# Patient Record
Sex: MALE | Race: WHITE | NOT HISPANIC OR LATINO | Employment: FULL TIME | URBAN - METROPOLITAN AREA
[De-identification: names, ages, dates, MRNs, and addresses within clinical notes are randomized per-mention and may not be internally consistent; named-entity substitution may affect disease eponyms.]

---

## 2021-03-20 ENCOUNTER — APPOINTMENT (OUTPATIENT)
Dept: RADIOLOGY | Facility: MEDICAL CENTER | Age: 31
DRG: 439 | End: 2021-03-20
Attending: EMERGENCY MEDICINE
Payer: COMMERCIAL

## 2021-03-20 ENCOUNTER — HOSPITAL ENCOUNTER (INPATIENT)
Facility: MEDICAL CENTER | Age: 31
LOS: 2 days | DRG: 439 | End: 2021-03-23
Attending: EMERGENCY MEDICINE | Admitting: STUDENT IN AN ORGANIZED HEALTH CARE EDUCATION/TRAINING PROGRAM
Payer: COMMERCIAL

## 2021-03-20 DIAGNOSIS — F10.10 ALCOHOL ABUSE: ICD-10-CM

## 2021-03-20 DIAGNOSIS — K85.20 ALCOHOL-INDUCED ACUTE PANCREATITIS, UNSPECIFIED COMPLICATION STATUS: ICD-10-CM

## 2021-03-20 DIAGNOSIS — R10.31 RIGHT LOWER QUADRANT ABDOMINAL PAIN: ICD-10-CM

## 2021-03-20 DIAGNOSIS — R11.2 NON-INTRACTABLE VOMITING WITH NAUSEA, UNSPECIFIED VOMITING TYPE: ICD-10-CM

## 2021-03-20 DIAGNOSIS — K52.9 COLITIS: ICD-10-CM

## 2021-03-20 DIAGNOSIS — A41.9 SEPSIS, DUE TO UNSPECIFIED ORGANISM, UNSPECIFIED WHETHER ACUTE ORGAN DYSFUNCTION PRESENT (HCC): Primary | ICD-10-CM

## 2021-03-20 LAB
ALBUMIN SERPL BCP-MCNC: 4.7 G/DL (ref 3.2–4.9)
ALBUMIN/GLOB SERPL: 1.3 G/DL
ALP SERPL-CCNC: 157 U/L (ref 30–99)
ALT SERPL-CCNC: 192 U/L (ref 2–50)
ANION GAP SERPL CALC-SCNC: 26 MMOL/L (ref 7–16)
AST SERPL-CCNC: 433 U/L (ref 12–45)
BASOPHILS # BLD AUTO: 1.6 % (ref 0–1.8)
BASOPHILS # BLD: 0.07 K/UL (ref 0–0.12)
BILIRUB SERPL-MCNC: 2.7 MG/DL (ref 0.1–1.5)
BUN SERPL-MCNC: 10 MG/DL (ref 8–22)
CALCIUM SERPL-MCNC: 9.7 MG/DL (ref 8.5–10.5)
CHLORIDE SERPL-SCNC: 89 MMOL/L (ref 96–112)
CO2 SERPL-SCNC: 21 MMOL/L (ref 20–33)
CREAT SERPL-MCNC: 0.61 MG/DL (ref 0.5–1.4)
EOSINOPHIL # BLD AUTO: 0.01 K/UL (ref 0–0.51)
EOSINOPHIL NFR BLD: 0.2 % (ref 0–6.9)
ERYTHROCYTE [DISTWIDTH] IN BLOOD BY AUTOMATED COUNT: 45.9 FL (ref 35.9–50)
ETHANOL BLD-MCNC: 86.5 MG/DL (ref 0–10)
GLOBULIN SER CALC-MCNC: 3.7 G/DL (ref 1.9–3.5)
GLUCOSE SERPL-MCNC: 112 MG/DL (ref 65–99)
HCT VFR BLD AUTO: 47.1 % (ref 42–52)
HGB BLD-MCNC: 17.3 G/DL (ref 14–18)
IMM GRANULOCYTES # BLD AUTO: 0.01 K/UL (ref 0–0.11)
IMM GRANULOCYTES NFR BLD AUTO: 0.2 % (ref 0–0.9)
LACTATE BLD-SCNC: 6.9 MMOL/L (ref 0.5–2)
LIPASE SERPL-CCNC: 290 U/L (ref 11–82)
LYMPHOCYTES # BLD AUTO: 0.41 K/UL (ref 1–4.8)
LYMPHOCYTES NFR BLD: 9.4 % (ref 22–41)
MCH RBC QN AUTO: 34.7 PG (ref 27–33)
MCHC RBC AUTO-ENTMCNC: 36.7 G/DL (ref 33.7–35.3)
MCV RBC AUTO: 94.6 FL (ref 81.4–97.8)
MONOCYTES # BLD AUTO: 0.42 K/UL (ref 0–0.85)
MONOCYTES NFR BLD AUTO: 9.6 % (ref 0–13.4)
NEUTROPHILS # BLD AUTO: 3.45 K/UL (ref 1.82–7.42)
NEUTROPHILS NFR BLD: 79 % (ref 44–72)
NRBC # BLD AUTO: 0 K/UL
NRBC BLD-RTO: 0 /100 WBC
PLATELET # BLD AUTO: 51 K/UL (ref 164–446)
PMV BLD AUTO: 10.6 FL (ref 9–12.9)
POTASSIUM SERPL-SCNC: 4.5 MMOL/L (ref 3.6–5.5)
PROT SERPL-MCNC: 8.4 G/DL (ref 6–8.2)
RBC # BLD AUTO: 4.98 M/UL (ref 4.7–6.1)
SODIUM SERPL-SCNC: 136 MMOL/L (ref 135–145)
WBC # BLD AUTO: 4.4 K/UL (ref 4.8–10.8)

## 2021-03-20 PROCEDURE — 83605 ASSAY OF LACTIC ACID: CPT

## 2021-03-20 PROCEDURE — 96375 TX/PRO/DX INJ NEW DRUG ADDON: CPT

## 2021-03-20 PROCEDURE — 80053 COMPREHEN METABOLIC PANEL: CPT

## 2021-03-20 PROCEDURE — 94760 N-INVAS EAR/PLS OXIMETRY 1: CPT

## 2021-03-20 PROCEDURE — 81001 URINALYSIS AUTO W/SCOPE: CPT

## 2021-03-20 PROCEDURE — 99285 EMERGENCY DEPT VISIT HI MDM: CPT

## 2021-03-20 PROCEDURE — 700105 HCHG RX REV CODE 258: Performed by: EMERGENCY MEDICINE

## 2021-03-20 PROCEDURE — 82077 ASSAY SPEC XCP UR&BREATH IA: CPT

## 2021-03-20 PROCEDURE — 700111 HCHG RX REV CODE 636 W/ 250 OVERRIDE (IP): Performed by: EMERGENCY MEDICINE

## 2021-03-20 PROCEDURE — 83690 ASSAY OF LIPASE: CPT

## 2021-03-20 PROCEDURE — 85610 PROTHROMBIN TIME: CPT

## 2021-03-20 PROCEDURE — 85025 COMPLETE CBC W/AUTO DIFF WBC: CPT

## 2021-03-20 RX ORDER — SODIUM CHLORIDE 9 MG/ML
1000 INJECTION, SOLUTION INTRAVENOUS ONCE
Status: COMPLETED | OUTPATIENT
Start: 2021-03-20 | End: 2021-03-21

## 2021-03-20 RX ORDER — ONDANSETRON 2 MG/ML
4 INJECTION INTRAMUSCULAR; INTRAVENOUS ONCE
Status: COMPLETED | OUTPATIENT
Start: 2021-03-20 | End: 2021-03-20

## 2021-03-20 RX ORDER — METOCLOPRAMIDE HYDROCHLORIDE 5 MG/ML
10 INJECTION INTRAMUSCULAR; INTRAVENOUS ONCE
Status: COMPLETED | OUTPATIENT
Start: 2021-03-20 | End: 2021-03-20

## 2021-03-20 RX ORDER — LORAZEPAM 2 MG/ML
1 INJECTION INTRAMUSCULAR ONCE
Status: COMPLETED | OUTPATIENT
Start: 2021-03-20 | End: 2021-03-20

## 2021-03-20 RX ADMIN — ONDANSETRON 4 MG: 2 INJECTION INTRAMUSCULAR; INTRAVENOUS at 23:17

## 2021-03-20 RX ADMIN — LORAZEPAM 1 MG: 2 INJECTION INTRAMUSCULAR; INTRAVENOUS at 23:18

## 2021-03-20 RX ADMIN — FENTANYL CITRATE 50 MCG: 50 INJECTION, SOLUTION INTRAMUSCULAR; INTRAVENOUS at 23:18

## 2021-03-20 RX ADMIN — SODIUM CHLORIDE 1000 ML: 9 INJECTION, SOLUTION INTRAVENOUS at 23:18

## 2021-03-20 RX ADMIN — METOCLOPRAMIDE 10 MG: 5 INJECTION, SOLUTION INTRAMUSCULAR; INTRAVENOUS at 23:17

## 2021-03-21 ENCOUNTER — APPOINTMENT (OUTPATIENT)
Dept: RADIOLOGY | Facility: MEDICAL CENTER | Age: 31
DRG: 439 | End: 2021-03-21
Attending: STUDENT IN AN ORGANIZED HEALTH CARE EDUCATION/TRAINING PROGRAM
Payer: COMMERCIAL

## 2021-03-21 ENCOUNTER — APPOINTMENT (OUTPATIENT)
Dept: RADIOLOGY | Facility: MEDICAL CENTER | Age: 31
DRG: 439 | End: 2021-03-21
Attending: EMERGENCY MEDICINE
Payer: COMMERCIAL

## 2021-03-21 PROBLEM — R94.31 QT PROLONGATION: Status: ACTIVE | Noted: 2021-03-21

## 2021-03-21 PROBLEM — K70.10 ALCOHOLIC HEPATITIS WITHOUT ASCITES: Status: ACTIVE | Noted: 2020-10-15

## 2021-03-21 PROBLEM — F10.939 ALCOHOL WITHDRAWAL (HCC): Status: ACTIVE | Noted: 2021-03-21

## 2021-03-21 PROBLEM — R10.13 ABDOMINAL PAIN, ACUTE, EPIGASTRIC: Status: ACTIVE | Noted: 2021-03-21

## 2021-03-21 PROBLEM — F10.10 ALCOHOL ABUSE: Status: ACTIVE | Noted: 2020-10-14

## 2021-03-21 PROBLEM — K29.20 ALCOHOLIC GASTRITIS: Status: ACTIVE | Noted: 2020-10-14

## 2021-03-21 PROBLEM — A41.9 SEPSIS (HCC): Status: ACTIVE | Noted: 2021-03-21

## 2021-03-21 PROBLEM — E87.20 LACTIC ACIDOSIS: Status: ACTIVE | Noted: 2020-10-14

## 2021-03-21 PROBLEM — K85.20 ALCOHOL-INDUCED ACUTE PANCREATITIS: Status: ACTIVE | Noted: 2020-10-15

## 2021-03-21 PROBLEM — D69.6 THROMBOCYTOPENIA (HCC): Status: ACTIVE | Noted: 2021-03-21

## 2021-03-21 LAB
ALBUMIN SERPL BCP-MCNC: 3.5 G/DL (ref 3.2–4.9)
ALBUMIN SERPL BCP-MCNC: 3.7 G/DL (ref 3.2–4.9)
ALBUMIN/GLOB SERPL: 1.4 G/DL
ALBUMIN/GLOB SERPL: 1.5 G/DL
ALP SERPL-CCNC: 112 U/L (ref 30–99)
ALP SERPL-CCNC: 114 U/L (ref 30–99)
ALT SERPL-CCNC: 118 U/L (ref 2–50)
ALT SERPL-CCNC: 138 U/L (ref 2–50)
AMMONIA PLAS-SCNC: 37 UMOL/L (ref 11–45)
AMYLASE SERPL-CCNC: 111 U/L (ref 20–103)
ANION GAP SERPL CALC-SCNC: 10 MMOL/L (ref 7–16)
ANION GAP SERPL CALC-SCNC: 14 MMOL/L (ref 7–16)
APPEARANCE UR: CLEAR
AST SERPL-CCNC: 248 U/L (ref 12–45)
AST SERPL-CCNC: 296 U/L (ref 12–45)
BACTERIA #/AREA URNS HPF: NEGATIVE /HPF
BASOPHILS # BLD AUTO: 0.8 % (ref 0–1.8)
BASOPHILS # BLD: 0.02 K/UL (ref 0–0.12)
BILIRUB SERPL-MCNC: 2.7 MG/DL (ref 0.1–1.5)
BILIRUB SERPL-MCNC: 3.6 MG/DL (ref 0.1–1.5)
BILIRUB UR QL STRIP.AUTO: ABNORMAL
BUN SERPL-MCNC: 5 MG/DL (ref 8–22)
BUN SERPL-MCNC: 9 MG/DL (ref 8–22)
CALCIUM SERPL-MCNC: 8.3 MG/DL (ref 8.5–10.5)
CALCIUM SERPL-MCNC: 8.5 MG/DL (ref 8.5–10.5)
CFT BLD TEG: 8.1 MIN (ref 5–10)
CHLORIDE SERPL-SCNC: 96 MMOL/L (ref 96–112)
CHLORIDE SERPL-SCNC: 97 MMOL/L (ref 96–112)
CLOT ANGLE BLD TEG: 42.3 DEGREES (ref 53–72)
CLOT LYSIS 30M P MA LENFR BLD TEG: 0 % (ref 0–8)
CO2 SERPL-SCNC: 26 MMOL/L (ref 20–33)
CO2 SERPL-SCNC: 28 MMOL/L (ref 20–33)
COLOR UR: ABNORMAL
CREAT SERPL-MCNC: 0.48 MG/DL (ref 0.5–1.4)
CREAT SERPL-MCNC: 0.53 MG/DL (ref 0.5–1.4)
CT.EXTRINSIC BLD ROTEM: 5.1 MIN (ref 1–3)
D DIMER PPP IA.FEU-MCNC: 1.05 UG/ML (FEU) (ref 0–0.5)
EKG IMPRESSION: NORMAL
EOSINOPHIL # BLD AUTO: 0.1 K/UL (ref 0–0.51)
EOSINOPHIL NFR BLD: 4.1 % (ref 0–6.9)
EPI CELLS #/AREA URNS HPF: NEGATIVE /HPF
ERYTHROCYTE [DISTWIDTH] IN BLOOD BY AUTOMATED COUNT: 46.6 FL (ref 35.9–50)
ERYTHROCYTE [DISTWIDTH] IN BLOOD BY AUTOMATED COUNT: 48.2 FL (ref 35.9–50)
GLOBULIN SER CALC-MCNC: 2.4 G/DL (ref 1.9–3.5)
GLOBULIN SER CALC-MCNC: 2.6 G/DL (ref 1.9–3.5)
GLUCOSE SERPL-MCNC: 100 MG/DL (ref 65–99)
GLUCOSE SERPL-MCNC: 116 MG/DL (ref 65–99)
GLUCOSE UR STRIP.AUTO-MCNC: ABNORMAL MG/DL
HCT VFR BLD AUTO: 36.9 % (ref 42–52)
HCT VFR BLD AUTO: 36.9 % (ref 42–52)
HGB BLD-MCNC: 13.3 G/DL (ref 14–18)
HGB BLD-MCNC: 13.4 G/DL (ref 14–18)
HYALINE CASTS #/AREA URNS LPF: ABNORMAL /LPF
IMM GRANULOCYTES # BLD AUTO: 0 K/UL (ref 0–0.11)
IMM GRANULOCYTES NFR BLD AUTO: 0 % (ref 0–0.9)
INR PPP: 1.13 (ref 0.87–1.13)
KETONES UR STRIP.AUTO-MCNC: ABNORMAL MG/DL
LACTATE BLD-SCNC: 2.7 MMOL/L (ref 0.5–2)
LACTATE BLD-SCNC: 5 MMOL/L (ref 0.5–2)
LACTATE BLD-SCNC: 5.7 MMOL/L (ref 0.5–2)
LEUKOCYTE ESTERASE UR QL STRIP.AUTO: ABNORMAL
LYMPHOCYTES # BLD AUTO: 0.9 K/UL (ref 1–4.8)
LYMPHOCYTES NFR BLD: 36.7 % (ref 22–41)
MAGNESIUM SERPL-MCNC: 1.1 MG/DL (ref 1.5–2.5)
MCF BLD TEG: 38.7 MM (ref 50–70)
MCH RBC QN AUTO: 34.5 PG (ref 27–33)
MCH RBC QN AUTO: 34.9 PG (ref 27–33)
MCHC RBC AUTO-ENTMCNC: 36 G/DL (ref 33.7–35.3)
MCHC RBC AUTO-ENTMCNC: 36.3 G/DL (ref 33.7–35.3)
MCV RBC AUTO: 95.1 FL (ref 81.4–97.8)
MCV RBC AUTO: 96.9 FL (ref 81.4–97.8)
MICRO URNS: ABNORMAL
MONOCYTES # BLD AUTO: 0.23 K/UL (ref 0–0.85)
MONOCYTES NFR BLD AUTO: 9.4 % (ref 0–13.4)
MUCOUS THREADS #/AREA URNS HPF: ABNORMAL /HPF
NEUTROPHILS # BLD AUTO: 1.2 K/UL (ref 1.82–7.42)
NEUTROPHILS NFR BLD: 49 % (ref 44–72)
NITRITE UR QL STRIP.AUTO: ABNORMAL
NRBC # BLD AUTO: 0 K/UL
NRBC BLD-RTO: 0 /100 WBC
NT-PROBNP SERPL IA-MCNC: 17 PG/ML (ref 0–125)
PH UR STRIP.AUTO: ABNORMAL [PH] (ref 5–8)
PLATELET # BLD AUTO: 25 K/UL (ref 164–446)
PLATELET # BLD AUTO: 31 K/UL (ref 164–446)
PMV BLD AUTO: 9.1 FL (ref 9–12.9)
PMV BLD AUTO: 9.9 FL (ref 9–12.9)
POTASSIUM SERPL-SCNC: 3.5 MMOL/L (ref 3.6–5.5)
POTASSIUM SERPL-SCNC: 4.4 MMOL/L (ref 3.6–5.5)
PROCALCITONIN SERPL-MCNC: 0.08 NG/ML
PROT SERPL-MCNC: 5.9 G/DL (ref 6–8.2)
PROT SERPL-MCNC: 6.3 G/DL (ref 6–8.2)
PROT UR QL STRIP: ABNORMAL MG/DL
PROTHROMBIN TIME: 14.8 SEC (ref 12–14.6)
RBC # BLD AUTO: 3.81 M/UL (ref 4.7–6.1)
RBC # BLD AUTO: 3.88 M/UL (ref 4.7–6.1)
RBC # URNS HPF: ABNORMAL /HPF
RBC UR QL AUTO: ABNORMAL
SARS-COV-2 RNA RESP QL NAA+PROBE: NOTDETECTED
SODIUM SERPL-SCNC: 134 MMOL/L (ref 135–145)
SODIUM SERPL-SCNC: 137 MMOL/L (ref 135–145)
SP GR UR STRIP.AUTO: ABNORMAL
SPECIMEN SOURCE: NORMAL
TEG ALGORITHM TGALG: ABNORMAL
UROBILINOGEN UR STRIP.AUTO-MCNC: ABNORMAL MG/DL
WBC # BLD AUTO: 2.5 K/UL (ref 4.8–10.8)
WBC # BLD AUTO: 3.6 K/UL (ref 4.8–10.8)
WBC #/AREA URNS HPF: ABNORMAL /HPF

## 2021-03-21 PROCEDURE — 93005 ELECTROCARDIOGRAM TRACING: CPT | Performed by: STUDENT IN AN ORGANIZED HEALTH CARE EDUCATION/TRAINING PROGRAM

## 2021-03-21 PROCEDURE — U0003 INFECTIOUS AGENT DETECTION BY NUCLEIC ACID (DNA OR RNA); SEVERE ACUTE RESPIRATORY SYNDROME CORONAVIRUS 2 (SARS-COV-2) (CORONAVIRUS DISEASE [COVID-19]), AMPLIFIED PROBE TECHNIQUE, MAKING USE OF HIGH THROUGHPUT TECHNOLOGIES AS DESCRIBED BY CMS-2020-01-R: HCPCS

## 2021-03-21 PROCEDURE — 700111 HCHG RX REV CODE 636 W/ 250 OVERRIDE (IP)

## 2021-03-21 PROCEDURE — 82150 ASSAY OF AMYLASE: CPT

## 2021-03-21 PROCEDURE — 96365 THER/PROPH/DIAG IV INF INIT: CPT

## 2021-03-21 PROCEDURE — 85379 FIBRIN DEGRADATION QUANT: CPT

## 2021-03-21 PROCEDURE — 700101 HCHG RX REV CODE 250: Performed by: STUDENT IN AN ORGANIZED HEALTH CARE EDUCATION/TRAINING PROGRAM

## 2021-03-21 PROCEDURE — 85576 BLOOD PLATELET AGGREGATION: CPT

## 2021-03-21 PROCEDURE — 700111 HCHG RX REV CODE 636 W/ 250 OVERRIDE (IP): Performed by: EMERGENCY MEDICINE

## 2021-03-21 PROCEDURE — A9270 NON-COVERED ITEM OR SERVICE: HCPCS | Performed by: STUDENT IN AN ORGANIZED HEALTH CARE EDUCATION/TRAINING PROGRAM

## 2021-03-21 PROCEDURE — 96368 THER/DIAG CONCURRENT INF: CPT

## 2021-03-21 PROCEDURE — 84145 PROCALCITONIN (PCT): CPT

## 2021-03-21 PROCEDURE — 80053 COMPREHEN METABOLIC PANEL: CPT

## 2021-03-21 PROCEDURE — 36415 COLL VENOUS BLD VENIPUNCTURE: CPT

## 2021-03-21 PROCEDURE — 74177 CT ABD & PELVIS W/CONTRAST: CPT

## 2021-03-21 PROCEDURE — 83735 ASSAY OF MAGNESIUM: CPT

## 2021-03-21 PROCEDURE — 82140 ASSAY OF AMMONIA: CPT

## 2021-03-21 PROCEDURE — 99233 SBSQ HOSP IP/OBS HIGH 50: CPT | Performed by: HOSPITALIST

## 2021-03-21 PROCEDURE — 700117 HCHG RX CONTRAST REV CODE 255: Performed by: EMERGENCY MEDICINE

## 2021-03-21 PROCEDURE — 87040 BLOOD CULTURE FOR BACTERIA: CPT

## 2021-03-21 PROCEDURE — U0005 INFEC AGEN DETEC AMPLI PROBE: HCPCS

## 2021-03-21 PROCEDURE — 700105 HCHG RX REV CODE 258: Performed by: STUDENT IN AN ORGANIZED HEALTH CARE EDUCATION/TRAINING PROGRAM

## 2021-03-21 PROCEDURE — 700111 HCHG RX REV CODE 636 W/ 250 OVERRIDE (IP): Performed by: STUDENT IN AN ORGANIZED HEALTH CARE EDUCATION/TRAINING PROGRAM

## 2021-03-21 PROCEDURE — 700102 HCHG RX REV CODE 250 W/ 637 OVERRIDE(OP): Performed by: STUDENT IN AN ORGANIZED HEALTH CARE EDUCATION/TRAINING PROGRAM

## 2021-03-21 PROCEDURE — 71045 X-RAY EXAM CHEST 1 VIEW: CPT

## 2021-03-21 PROCEDURE — 96367 TX/PROPH/DG ADDL SEQ IV INF: CPT

## 2021-03-21 PROCEDURE — 99223 1ST HOSP IP/OBS HIGH 75: CPT | Performed by: STUDENT IN AN ORGANIZED HEALTH CARE EDUCATION/TRAINING PROGRAM

## 2021-03-21 PROCEDURE — 85347 COAGULATION TIME ACTIVATED: CPT

## 2021-03-21 PROCEDURE — 700105 HCHG RX REV CODE 258: Performed by: EMERGENCY MEDICINE

## 2021-03-21 PROCEDURE — 85025 COMPLETE CBC W/AUTO DIFF WBC: CPT

## 2021-03-21 PROCEDURE — 85384 FIBRINOGEN ACTIVITY: CPT

## 2021-03-21 PROCEDURE — 96376 TX/PRO/DX INJ SAME DRUG ADON: CPT

## 2021-03-21 PROCEDURE — 770020 HCHG ROOM/CARE - TELE (206)

## 2021-03-21 PROCEDURE — 700101 HCHG RX REV CODE 250: Performed by: EMERGENCY MEDICINE

## 2021-03-21 PROCEDURE — 83880 ASSAY OF NATRIURETIC PEPTIDE: CPT

## 2021-03-21 PROCEDURE — 85027 COMPLETE CBC AUTOMATED: CPT

## 2021-03-21 PROCEDURE — 83605 ASSAY OF LACTIC ACID: CPT | Mod: 91

## 2021-03-21 RX ORDER — LORAZEPAM 2 MG/ML
1.5 INJECTION INTRAMUSCULAR
Status: DISCONTINUED | OUTPATIENT
Start: 2021-03-21 | End: 2021-03-23 | Stop reason: HOSPADM

## 2021-03-21 RX ORDER — KETOROLAC TROMETHAMINE 30 MG/ML
30 INJECTION, SOLUTION INTRAMUSCULAR; INTRAVENOUS EVERY 6 HOURS PRN
Status: DISCONTINUED | OUTPATIENT
Start: 2021-03-21 | End: 2021-03-22

## 2021-03-21 RX ORDER — LORAZEPAM 2 MG/ML
0.5 INJECTION INTRAMUSCULAR EVERY 4 HOURS PRN
Status: DISCONTINUED | OUTPATIENT
Start: 2021-03-21 | End: 2021-03-23 | Stop reason: HOSPADM

## 2021-03-21 RX ORDER — SODIUM CHLORIDE, SODIUM LACTATE, POTASSIUM CHLORIDE, CALCIUM CHLORIDE 600; 310; 30; 20 MG/100ML; MG/100ML; MG/100ML; MG/100ML
1000 INJECTION, SOLUTION INTRAVENOUS ONCE
Status: DISCONTINUED | OUTPATIENT
Start: 2021-03-21 | End: 2021-03-21

## 2021-03-21 RX ORDER — LORAZEPAM 2 MG/ML
2 INJECTION INTRAMUSCULAR ONCE
Status: COMPLETED | OUTPATIENT
Start: 2021-03-21 | End: 2021-03-21

## 2021-03-21 RX ORDER — PROCHLORPERAZINE EDISYLATE 5 MG/ML
5-10 INJECTION INTRAMUSCULAR; INTRAVENOUS EVERY 4 HOURS PRN
Status: DISCONTINUED | OUTPATIENT
Start: 2021-03-21 | End: 2021-03-21

## 2021-03-21 RX ORDER — ONDANSETRON 4 MG/1
4 TABLET, ORALLY DISINTEGRATING ORAL EVERY 4 HOURS PRN
Status: DISCONTINUED | OUTPATIENT
Start: 2021-03-21 | End: 2021-03-21

## 2021-03-21 RX ORDER — LORAZEPAM 1 MG/1
1 TABLET ORAL EVERY 4 HOURS PRN
Status: DISCONTINUED | OUTPATIENT
Start: 2021-03-21 | End: 2021-03-23 | Stop reason: HOSPADM

## 2021-03-21 RX ORDER — LORAZEPAM 0.5 MG/1
0.5 TABLET ORAL EVERY 4 HOURS PRN
Status: DISCONTINUED | OUTPATIENT
Start: 2021-03-21 | End: 2021-03-23 | Stop reason: HOSPADM

## 2021-03-21 RX ORDER — FENTANYL 25 UG/1
1 PATCH TRANSDERMAL
Status: DISCONTINUED | OUTPATIENT
Start: 2021-03-21 | End: 2021-03-21

## 2021-03-21 RX ORDER — LEVOFLOXACIN 5 MG/ML
750 INJECTION, SOLUTION INTRAVENOUS EVERY 24 HOURS
Status: DISCONTINUED | OUTPATIENT
Start: 2021-03-21 | End: 2021-03-21

## 2021-03-21 RX ORDER — ONDANSETRON 2 MG/ML
4 INJECTION INTRAMUSCULAR; INTRAVENOUS EVERY 4 HOURS PRN
Status: DISCONTINUED | OUTPATIENT
Start: 2021-03-21 | End: 2021-03-21

## 2021-03-21 RX ORDER — SODIUM CHLORIDE 9 MG/ML
INJECTION, SOLUTION INTRAVENOUS CONTINUOUS
Status: DISCONTINUED | OUTPATIENT
Start: 2021-03-21 | End: 2021-03-21

## 2021-03-21 RX ORDER — GAUZE BANDAGE 2" X 2"
100 BANDAGE TOPICAL DAILY
Status: DISCONTINUED | OUTPATIENT
Start: 2021-03-22 | End: 2021-03-23 | Stop reason: HOSPADM

## 2021-03-21 RX ORDER — SODIUM CHLORIDE, SODIUM LACTATE, POTASSIUM CHLORIDE, CALCIUM CHLORIDE 600; 310; 30; 20 MG/100ML; MG/100ML; MG/100ML; MG/100ML
INJECTION, SOLUTION INTRAVENOUS CONTINUOUS
Status: DISCONTINUED | OUTPATIENT
Start: 2021-03-21 | End: 2021-03-21

## 2021-03-21 RX ORDER — LORAZEPAM 2 MG/1
2 TABLET ORAL
Status: DISCONTINUED | OUTPATIENT
Start: 2021-03-21 | End: 2021-03-23 | Stop reason: HOSPADM

## 2021-03-21 RX ORDER — FOLIC ACID 1 MG/1
1 TABLET ORAL DAILY
Status: DISCONTINUED | OUTPATIENT
Start: 2021-03-22 | End: 2021-03-23 | Stop reason: HOSPADM

## 2021-03-21 RX ORDER — ACETAMINOPHEN 325 MG/1
650 TABLET ORAL EVERY 6 HOURS PRN
Status: DISCONTINUED | OUTPATIENT
Start: 2021-03-21 | End: 2021-03-21

## 2021-03-21 RX ORDER — LORAZEPAM 2 MG/1
4 TABLET ORAL
Status: DISCONTINUED | OUTPATIENT
Start: 2021-03-21 | End: 2021-03-23 | Stop reason: HOSPADM

## 2021-03-21 RX ORDER — PROMETHAZINE HYDROCHLORIDE 12.5 MG/1
12.5-25 SUPPOSITORY RECTAL EVERY 4 HOURS PRN
Status: DISCONTINUED | OUTPATIENT
Start: 2021-03-21 | End: 2021-03-21

## 2021-03-21 RX ORDER — SODIUM CHLORIDE, SODIUM LACTATE, POTASSIUM CHLORIDE, CALCIUM CHLORIDE 600; 310; 30; 20 MG/100ML; MG/100ML; MG/100ML; MG/100ML
1000 INJECTION, SOLUTION INTRAVENOUS ONCE
Status: COMPLETED | OUTPATIENT
Start: 2021-03-21 | End: 2021-03-21

## 2021-03-21 RX ORDER — LORAZEPAM 2 MG/ML
2 INJECTION INTRAMUSCULAR
Status: DISCONTINUED | OUTPATIENT
Start: 2021-03-21 | End: 2021-03-23 | Stop reason: HOSPADM

## 2021-03-21 RX ORDER — LEVOFLOXACIN 5 MG/ML
750 INJECTION, SOLUTION INTRAVENOUS ONCE
Status: COMPLETED | OUTPATIENT
Start: 2021-03-21 | End: 2021-03-21

## 2021-03-21 RX ORDER — PROMETHAZINE HYDROCHLORIDE 25 MG/1
12.5-25 TABLET ORAL EVERY 4 HOURS PRN
Status: DISCONTINUED | OUTPATIENT
Start: 2021-03-21 | End: 2021-03-21

## 2021-03-21 RX ORDER — MAGNESIUM SULFATE HEPTAHYDRATE 40 MG/ML
2 INJECTION, SOLUTION INTRAVENOUS ONCE
Status: COMPLETED | OUTPATIENT
Start: 2021-03-21 | End: 2021-03-21

## 2021-03-21 RX ORDER — POLYETHYLENE GLYCOL 3350 17 G/17G
1 POWDER, FOR SOLUTION ORAL
Status: DISCONTINUED | OUTPATIENT
Start: 2021-03-21 | End: 2021-03-23 | Stop reason: HOSPADM

## 2021-03-21 RX ORDER — LORAZEPAM 2 MG/ML
1 INJECTION INTRAMUSCULAR
Status: DISCONTINUED | OUTPATIENT
Start: 2021-03-21 | End: 2021-03-23 | Stop reason: HOSPADM

## 2021-03-21 RX ORDER — LABETALOL HYDROCHLORIDE 5 MG/ML
10 INJECTION, SOLUTION INTRAVENOUS EVERY 4 HOURS PRN
Status: DISCONTINUED | OUTPATIENT
Start: 2021-03-21 | End: 2021-03-23 | Stop reason: HOSPADM

## 2021-03-21 RX ORDER — SODIUM CHLORIDE, SODIUM LACTATE, POTASSIUM CHLORIDE, AND CALCIUM CHLORIDE .6; .31; .03; .02 G/100ML; G/100ML; G/100ML; G/100ML
30 INJECTION, SOLUTION INTRAVENOUS ONCE
Status: COMPLETED | OUTPATIENT
Start: 2021-03-21 | End: 2021-03-21

## 2021-03-21 RX ORDER — SODIUM CHLORIDE, SODIUM LACTATE, POTASSIUM CHLORIDE, AND CALCIUM CHLORIDE .6; .31; .03; .02 G/100ML; G/100ML; G/100ML; G/100ML
500 INJECTION, SOLUTION INTRAVENOUS
Status: DISCONTINUED | OUTPATIENT
Start: 2021-03-20 | End: 2021-03-21

## 2021-03-21 RX ORDER — BISACODYL 10 MG
10 SUPPOSITORY, RECTAL RECTAL
Status: DISCONTINUED | OUTPATIENT
Start: 2021-03-21 | End: 2021-03-23 | Stop reason: HOSPADM

## 2021-03-21 RX ORDER — AMOXICILLIN 250 MG
2 CAPSULE ORAL 2 TIMES DAILY
Status: DISCONTINUED | OUTPATIENT
Start: 2021-03-21 | End: 2021-03-23 | Stop reason: HOSPADM

## 2021-03-21 RX ADMIN — FENTANYL CITRATE 50 MCG: 50 INJECTION, SOLUTION INTRAMUSCULAR; INTRAVENOUS at 03:14

## 2021-03-21 RX ADMIN — LEVOFLOXACIN 750 MG: 750 INJECTION, SOLUTION INTRAVENOUS at 01:02

## 2021-03-21 RX ADMIN — SODIUM CHLORIDE, POTASSIUM CHLORIDE, SODIUM LACTATE AND CALCIUM CHLORIDE 1000 ML: 600; 310; 30; 20 INJECTION, SOLUTION INTRAVENOUS at 03:15

## 2021-03-21 RX ADMIN — LABETALOL HYDROCHLORIDE 10 MG: 5 INJECTION, SOLUTION INTRAVENOUS at 08:02

## 2021-03-21 RX ADMIN — KETOROLAC TROMETHAMINE 30 MG: 30 INJECTION, SOLUTION INTRAMUSCULAR; INTRAVENOUS at 23:36

## 2021-03-21 RX ADMIN — THIAMINE HYDROCHLORIDE: 100 INJECTION, SOLUTION INTRAMUSCULAR; INTRAVENOUS at 04:27

## 2021-03-21 RX ADMIN — KETOROLAC TROMETHAMINE 30 MG: 30 INJECTION, SOLUTION INTRAMUSCULAR; INTRAVENOUS at 18:05

## 2021-03-21 RX ADMIN — CHLORPROMAZINE HYDROCHLORIDE 25 MG: 25 INJECTION INTRAMUSCULAR at 11:55

## 2021-03-21 RX ADMIN — KETOROLAC TROMETHAMINE 30 MG: 30 INJECTION, SOLUTION INTRAMUSCULAR; INTRAVENOUS at 12:00

## 2021-03-21 RX ADMIN — SODIUM CHLORIDE, POTASSIUM CHLORIDE, SODIUM LACTATE AND CALCIUM CHLORIDE 1800 ML: 600; 310; 30; 20 INJECTION, SOLUTION INTRAVENOUS at 00:59

## 2021-03-21 RX ADMIN — CHLORPROMAZINE HYDROCHLORIDE 25 MG: 25 INJECTION INTRAMUSCULAR at 06:35

## 2021-03-21 RX ADMIN — METRONIDAZOLE 500 MG: 500 INJECTION, SOLUTION INTRAVENOUS at 02:26

## 2021-03-21 RX ADMIN — LORAZEPAM 0.5 MG: 0.5 TABLET ORAL at 05:56

## 2021-03-21 RX ADMIN — LORAZEPAM 2 MG: 2 INJECTION INTRAMUSCULAR; INTRAVENOUS at 03:14

## 2021-03-21 RX ADMIN — SODIUM CHLORIDE: 9 INJECTION, SOLUTION INTRAVENOUS at 05:55

## 2021-03-21 RX ADMIN — MAGNESIUM SULFATE 2 G: 2 INJECTION INTRAVENOUS at 11:55

## 2021-03-21 RX ADMIN — IOHEXOL 90 ML: 350 INJECTION, SOLUTION INTRAVENOUS at 02:30

## 2021-03-21 RX ADMIN — METRONIDAZOLE 500 MG: 500 INJECTION, SOLUTION INTRAVENOUS at 08:03

## 2021-03-21 RX ADMIN — FENTANYL CITRATE 100 MCG: 50 INJECTION, SOLUTION INTRAMUSCULAR; INTRAVENOUS at 01:12

## 2021-03-21 RX ADMIN — SODIUM CHLORIDE, POTASSIUM CHLORIDE, SODIUM LACTATE AND CALCIUM CHLORIDE: 600; 310; 30; 20 INJECTION, SOLUTION INTRAVENOUS at 09:30

## 2021-03-21 RX ADMIN — FENTANYL 1 PATCH: 25 PATCH, EXTENDED RELEASE TRANSDERMAL at 08:03

## 2021-03-21 ASSESSMENT — LIFESTYLE VARIABLES
DOES PATIENT WANT TO TALK TO SOMEONE ABOUT QUITTING: YES
AGITATION: NORMAL ACTIVITY
TREMOR: NO TREMOR
AUDITORY DISTURBANCES: NOT PRESENT
PAROXYSMAL SWEATS: NO SWEAT VISIBLE
TREMOR: NO TREMOR
VISUAL DISTURBANCES: NOT PRESENT
HEADACHE, FULLNESS IN HEAD: NOT PRESENT
VISUAL DISTURBANCES: NOT PRESENT
ON A TYPICAL DAY WHEN YOU DRINK ALCOHOL HOW MANY DRINKS DO YOU HAVE: 1
TREMOR: NO TREMOR
ALCOHOL_USE: YES
ORIENTATION AND CLOUDING OF SENSORIUM: ORIENTED AND CAN DO SERIAL ADDITIONS
NAUSEA AND VOMITING: MILD NAUSEA WITH NO VOMITING
NAUSEA AND VOMITING: MILD NAUSEA WITH NO VOMITING
HEADACHE, FULLNESS IN HEAD: NOT PRESENT
NAUSEA AND VOMITING: NO NAUSEA AND NO VOMITING
ORIENTATION AND CLOUDING OF SENSORIUM: ORIENTED AND CAN DO SERIAL ADDITIONS
NAUSEA AND VOMITING: NO NAUSEA AND NO VOMITING
ANXIETY: NO ANXIETY (AT EASE)
AUDITORY DISTURBANCES: NOT PRESENT
AGITATION: NORMAL ACTIVITY
AUDITORY DISTURBANCES: NOT PRESENT
EVER FELT BAD OR GUILTY ABOUT YOUR DRINKING: YES
TOTAL SCORE: 3
VISUAL DISTURBANCES: NOT PRESENT
HEADACHE, FULLNESS IN HEAD: NOT PRESENT
TOTAL SCORE: 0
ANXIETY: MILDLY ANXIOUS
ANXIETY: NO ANXIETY (AT EASE)
ORIENTATION AND CLOUDING OF SENSORIUM: ORIENTED AND CAN DO SERIAL ADDITIONS
TREMOR: *
DOES PATIENT WANT TO STOP DRINKING: YES
TOTAL SCORE: 9
HEADACHE, FULLNESS IN HEAD: NOT PRESENT
TOTAL SCORE: 3
VISUAL DISTURBANCES: NOT PRESENT
TOTAL SCORE: 2
TOTAL SCORE: 10
HAVE YOU EVER FELT YOU SHOULD CUT DOWN ON YOUR DRINKING: YES
TREMOR: *
PAROXYSMAL SWEATS: BARELY PERCEPTIBLE SWEATING. PALMS MOIST
PAROXYSMAL SWEATS: *
ANXIETY: NO ANXIETY (AT EASE)
VISUAL DISTURBANCES: NOT PRESENT
AGITATION: NORMAL ACTIVITY
HAVE PEOPLE ANNOYED YOU BY CRITICIZING YOUR DRINKING: NO
ANXIETY: NO ANXIETY (AT EASE)
NAUSEA AND VOMITING: MILD NAUSEA WITH NO VOMITING
ANXIETY: NO ANXIETY (AT EASE)
HEADACHE, FULLNESS IN HEAD: NOT PRESENT
AVERAGE NUMBER OF DAYS PER WEEK YOU HAVE A DRINK CONTAINING ALCOHOL: 3
TREMOR: NO TREMOR
ORIENTATION AND CLOUDING OF SENSORIUM: ORIENTED AND CAN DO SERIAL ADDITIONS
AGITATION: NORMAL ACTIVITY
AUDITORY DISTURBANCES: NOT PRESENT
AGITATION: NORMAL ACTIVITY
TOTAL SCORE: 0
HOW MANY TIMES IN THE PAST YEAR HAVE YOU HAD 5 OR MORE DRINKS IN A DAY: 56
CONSUMPTION TOTAL: POSITIVE
VISUAL DISTURBANCES: NOT PRESENT
TOTAL SCORE: 2
PAROXYSMAL SWEATS: NO SWEAT VISIBLE
NAUSEA AND VOMITING: INTERMITTENT NAUSEA WITH DRY HEAVES
PAROXYSMAL SWEATS: BEADS OF SWEAT OBVIOUS ON FOREHEAD
AGITATION: NORMAL ACTIVITY
HEADACHE, FULLNESS IN HEAD: NOT PRESENT
TOTAL SCORE: 3
ORIENTATION AND CLOUDING OF SENSORIUM: ORIENTED AND CAN DO SERIAL ADDITIONS
ORIENTATION AND CLOUDING OF SENSORIUM: ORIENTED AND CAN DO SERIAL ADDITIONS
EVER HAD A DRINK FIRST THING IN THE MORNING TO STEADY YOUR NERVES TO GET RID OF A HANGOVER: YES
AUDITORY DISTURBANCES: NOT PRESENT
AUDITORY DISTURBANCES: NOT PRESENT
PAROXYSMAL SWEATS: NO SWEAT VISIBLE

## 2021-03-21 ASSESSMENT — COGNITIVE AND FUNCTIONAL STATUS - GENERAL
SUGGESTED CMS G CODE MODIFIER MOBILITY: CJ
DRESSING REGULAR LOWER BODY CLOTHING: A LITTLE
HELP NEEDED FOR BATHING: A LITTLE
CLIMB 3 TO 5 STEPS WITH RAILING: A LITTLE
WALKING IN HOSPITAL ROOM: A LITTLE
MOBILITY SCORE: 21
STANDING UP FROM CHAIR USING ARMS: A LITTLE
DRESSING REGULAR UPPER BODY CLOTHING: A LITTLE

## 2021-03-21 ASSESSMENT — PATIENT HEALTH QUESTIONNAIRE - PHQ9
SUM OF ALL RESPONSES TO PHQ9 QUESTIONS 1 AND 2: 0
2. FEELING DOWN, DEPRESSED, IRRITABLE, OR HOPELESS: NOT AT ALL
1. LITTLE INTEREST OR PLEASURE IN DOING THINGS: NOT AT ALL

## 2021-03-21 ASSESSMENT — PAIN DESCRIPTION - PAIN TYPE
TYPE: ACUTE PAIN

## 2021-03-21 ASSESSMENT — FIBROSIS 4 INDEX
FIB4 SCORE: 26.01
FIB4 SCORE: 26.01
FIB4 SCORE: 29.22

## 2021-03-21 NOTE — ED NOTES
Bedside report given to ISAAK Dill. Pt transferring to T801/00 via gurney on monitor w/ RN, pt A&Ox4, 2L O2. Pt belongings in possession, NAD noted. Hospitalist at bedside.

## 2021-03-21 NOTE — H&P
"Hospital Medicine History & Physical Note    Date of Service  3/21/2021    Primary Care Physician  Pcp Pt States None    Consultants  None    Code Status  Full Code    Chief Complaint  Chief Complaint   Patient presents with   • N/V     x 5-6 days. diffuse abd pain.        History of Presenting Illness  31M with intermittent n/v x 5-6 days, unable to keep anyting down and moderate diffuse abdominal pain. Pt also endorses chills, diaphoresis. Otherwise patient Denies sob, testicular pain, cough, chest pain/pressure, congestion, extremity swelling, diaphoresis, dizziness, weakness, unintentional weight changes, fever, chills, nausea, vomiting, hemoptysis, diarrhea, constipation, headaches, dysuria/dyspareunia, polyuria, sore throat or polydipsia. Denies history of Malignancy, drug, or cannabinoid use. He denies history of pancreatitis, abdominal surgeries, covid-19. He drinks 0.5pt of alcohol a day.      Review of Systems  ROS  10+ systems reviewed and negative except as noted in HPI.    Past Medical History   has a past medical history of Asthma and Helicobacter pylori (H. pylori) infection.    Surgical History   has no past surgical history on file.     Family History  Family history is unknown by patient.   No pertinent family history elicited.    Social History   reports that he has been smoking. He has never used smokeless tobacco. He reports current alcohol use. He reports current drug use.    Allergies  Allergies   Allergen Reactions   • Morphine Unspecified     Pt sts allergic rxn to morphine is hallucinations.    • Other Drug Swelling     GI Cocktail- \"throat swells\"   • Penicillins Anaphylaxis       Medications  Prior to Admission Medications   Prescriptions Last Dose Informant Patient Reported? Taking?   albuterol 108 (90 Base) MCG/ACT Aero Soln inhalation aerosol Not Taking at Unknown time Patient No No   Sig: Inhale 2 Puffs by mouth every 6 hours as needed for Shortness of Breath (cough).   Patient not " taking: Reported on 3/21/2021      Facility-Administered Medications: None       Physical Exam  Temp:  [36.1 °C (96.9 °F)-36.2 °C (97.2 °F)] 36.1 °C (96.9 °F)  Pulse:  [] 88  Resp:  [16-22] 19  BP: (109-162)/() 123/84  SpO2:  [93 %-98 %] 94 %    Physical Exam  Physical Exam  Vitals and nursing note reviewed.  Constitutional:     General: Alert in no acute distress.    Appearance: Pt is ill-appearing.     Comments:   HENT: No signs of trauma, Bilateral external ears normal, Nose normal.      Head: Normocephalic.     Mouth/Throat: Unremarkable. Moist Mucosa     Comments:   Eyes:      Pupils: Pupils are equal round and reactive to light, Conjunctiva normal, Non-icteric.   Neck: Normal range of motion, No tenderness, Supple, No stridor.   Cardiovascular:      Rate and Rhythm: Regular Rate and Rhythm     Heart sounds: No murmur, rubs, or gallops appreciated.  Pulmonary/Thorax & Lungs:      Effort: No respiratory distress.     Breath sounds: No stridor. No wheezing, No Rhonchi, no palpable chest tenderness     Comments:    Abdomen:     General: There is no distension.      Palpation/Auscultation: Soft, Tender all quadrants with guarding, No masses, No pulsatile masses. Bowel sounds hyperactive. No rebound/peritoneal signs. Negative Briggs sign.     Hernia: No hernia is appreciated at this time.   Skin: Warm, Dry, No erythema, No rash.       Coloration: Skin is not jaundiced or pale.   Back: No bony tenderness, No CVA tenderness.   Musculoskeletal:         General: No swelling or tenderness.   Extremities:       General: Intact distal pulses, No edema, No tenderness, No cyanosis      Vascular:   Neurologic/Psych: Alert, No focal deficits noted.       Comments:             Laboratory:  Recent Labs     03/20/21  2233 03/21/21  0358   WBC 4.4* 3.6*   RBC 4.98 3.88*   HEMOGLOBIN 17.3 13.4*   HEMATOCRIT 47.1 36.9*   MCV 94.6 95.1   MCH 34.7* 34.5*   MCHC 36.7* 36.3*   RDW 45.9 46.6   PLATELETCT 51* 31*   MPV 10.6  9.9     Recent Labs     03/20/21 2233 03/21/21  0358   SODIUM 136 137   POTASSIUM 4.5 4.4   CHLORIDE 89* 97   CO2 21 26   GLUCOSE 112* 116*   BUN 10 9   CREATININE 0.61 0.48*   CALCIUM 9.7 8.3*     Recent Labs     03/20/21 2233 03/21/21  0232 03/21/21  0358   ALTSGPT 192*  --  138*   ASTSGOT 433*  --  296*   ALKPHOSPHAT 157*  --  114*   TBILIRUBIN 2.7*  --  2.7*   AMYLASE  --  111*  --    LIPASE 290*  --   --    GLUCOSE 112*  --  116*     Recent Labs     03/20/21 2233   INR 1.13     Recent Labs     03/21/21  0232   NTPROBNP 17         No results for input(s): TROPONINT in the last 72 hours.    Imaging:  CT-ABDOMEN-PELVIS WITH   Final Result         1.  Mild cecal and ascending colonic wall thickening, appearance suggests focal segmental colitis.   2.  Nonvisualization of the appendix limits evaluation for and exclusion of appendicitis.   3.  Hepatomegaly and diffuse hepatic steatosis      DX-CHEST-LIMITED (1 VIEW)    (Results Pending)         Assessment/Plan:  I anticipate this patient will require at least two midnights for appropriate medical management, necessitating inpatient admission.    Sepsis (HCC)- (present on admission)  Assessment & Plan  This is Sepsis Present on admission  SIRS criteria identified on my evaluation include: Fever, with temperature greater than 101 deg F, Tachycardia, with heart rate greater than 90 BPM and Leukocytosis, with WBC greater than 12,000  Source is Unknown  Sepsis protocol initiated  Fluid resuscitation ordered per protocol  IV antibiotics as appropriate for source of sepsis  While organ dysfunction may be noted elsewhere in this problem list or in the chart, degree of organ dysfunction does not meet CMS criteria for severe sepsis    BCx, CXR, Procal pending,  UA negative  CTAP  Levaquin/Flagyl          Alcoholic hepatitis without ascites- (present on admission)  Assessment & Plan  Trend LFT's  IVF  Ammonia level  INR      QT prolongation- (present on admission)  Assessment  & Plan  Borderline  Ativan for Nausea Control  Given 1 dose thorazine upon admission  Repeat EKG for resolution of QT    Thrombocytopenia (HCC)- (present on admission)  Assessment & Plan  Likely 2/2 alcohol abuse  Monitor, transfuse Plates if <15 or bleeding      Lactic acidosis- (present on admission)  Assessment & Plan  Improving slightly with IVF  Cont. IVF, giving banana bag  Likely 2/2 Liver failure in setting of alcohol abuse.      Alcohol-induced acute pancreatitis- (present on admission)  Assessment & Plan  Negative on CT for pancreatitis  Lipase possibly chronically elevated in setting of alcoholism. Amylase   Judicious IVF    Alcohol withdrawal (HCC)- (present on admission)  Assessment & Plan  CIWA Protocol  Admitted to telemetry

## 2021-03-21 NOTE — ASSESSMENT & PLAN NOTE
Improved on CIWA Protocol, most recent 3. Requiring 2mg Ativan the last 24 hours.  -telemetry  -MV, folic acid, Thiamine  -phos >2, Mg>2, K>4  -watch for refeeding syndrome  -aspiration, seizure, fall precautions

## 2021-03-21 NOTE — ED NOTES
NS bolus infused. Repeat lactic drawn and set. 2nd PIV placed, blood cultures x 2, COVID swab collected and sent. Sepsis bolus, IV abx infusing per MAR. Medicated for 8/10 lower abd pain.

## 2021-03-21 NOTE — ASSESSMENT & PLAN NOTE
Uptrending from 22->27. Admission 51. DDX: alcohol abuse and alcoholic hepatitis w/ hypersplenism and bone marrow suppression, though no leukopenia or anemia appreciated.. 4T's score <5%, low probability for HIT. No medications are list that could cause this. No rashes or signs of mucocutaneous bleeding, lower on the differential is ITP. Sepsis, DIC or infection not suspected.  -pending peripheral smear  -:HIV, Hepatitis panel (Hep C), H. Pylori, TSH, B1/folate: negative,  -pending SOPHIA  -transfuse Plates if <15 or bleeding  -CTM

## 2021-03-21 NOTE — ED TRIAGE NOTES
Chief Complaint   Patient presents with   • N/V     x 5-6 days. diffuse abd pain.      Pt arrived for above c/o. Denies bloody vomit. Emesis bile like color. Decreased appetite.     BP (!) 162/99   Pulse (!) 115   Temp 36.2 °C (97.2 °F) (Temporal)   Resp 17   SpO2 94%

## 2021-03-21 NOTE — PROGRESS NOTES
"Daily Progress Note:     Date of Service: 3/21/2021  Primary Team: [facility] Orange Team   Attending: Jyoti Nava M.D.   Senior Resident: Dr. Cotter  Intern: Dr. Yadav  Contact:  [phone]    Chief Complaint:   \" Stomach pain, nausea and vomiting onset 5 days\"    Subjective: Patient is 31-year-old male with a past medical history of alcohol induced pancreatitis, colitis likely inflammatory and less likely infectious, history of alcohol abuse presented to ER with complaint of ongoing abdominal pain around the mid epigastric area associated with nausea and vomiting, ongoing past 4 to 5 days, no exacerbating or relieving factors reported.  Patient describes the pain as sharp, 8 out of 10 in intensity, nonradiating.    Patient had similar episode in last year October 2020 when he was admitted to [facility] in [place] where ultrasound of the abdomen done 10/15/2020 showed normal gallbladder, suspicious for mild acute pancreatitis without peripancreatic fluid collection, fatty enlarged liver, no intra or extra hepatic biliary duct dilation.     Interval update 3/21/2021  No events since patient has been admitted.  Patient was comfortably sitting in bed and eating breakfast, unable to finish as thinking may get nauseous, no episode of emesis reported.  Patient reported pain has been completely improved since admission, started on fentanyl patch before transferred to .  Patient agreed to try clear liquid diet and has been adequately drinking water.    Consultants/Specialty:  None    Review of Systems: As per HPI  10+ systems reviewed and negative except as noted in HPI.    Objective Data:   Physical Exam:   Vitals:   Temp:  [36.1 °C (96.9 °F)-37 °C (98.6 °F)] 36.7 °C (98 °F)  Pulse:  [] 99  Resp:  [16-22] 16  BP: (109-162)/() 111/70  SpO2:  [92 %-98 %] 92 %    General-patient is well-developed, ill-appearing, not in acute distress.  Lungs-clear to auscultate bilaterally.  Heart-RRR, no " murmur or gallop,  Abdomen-positive bowel sounds in all 4 quadrant, nontender to palpate, no guarding or rebound.  Musculoskeletal-no swelling, able to move all extremities, no deformity noted.  Skin-warm and dry  Neuro-alert and oriented x3, no focal deficit present.  Psychiatric-mood is normal, nonsuicidal and homicidal ideation      Labs:   Results for orders placed or performed during the hospital encounter of 03/20/21   CBC WITH DIFFERENTIAL   Result Value Ref Range    WBC 4.4 (L) 4.8 - 10.8 K/uL    RBC 4.98 4.70 - 6.10 M/uL    Hemoglobin 17.3 14.0 - 18.0 g/dL    Hematocrit 47.1 42.0 - 52.0 %    MCV 94.6 81.4 - 97.8 fL    MCH 34.7 (H) 27.0 - 33.0 pg    MCHC 36.7 (H) 33.7 - 35.3 g/dL    RDW 45.9 35.9 - 50.0 fL    Platelet Count 51 (L) 164 - 446 K/uL    MPV 10.6 9.0 - 12.9 fL    Neutrophils-Polys 79.00 (H) 44.00 - 72.00 %    Lymphocytes 9.40 (L) 22.00 - 41.00 %    Monocytes 9.60 0.00 - 13.40 %    Eosinophils 0.20 0.00 - 6.90 %    Basophils 1.60 0.00 - 1.80 %    Immature Granulocytes 0.20 0.00 - 0.90 %    Nucleated RBC 0.00 /100 WBC    Neutrophils (Absolute) 3.45 1.82 - 7.42 K/uL    Lymphs (Absolute) 0.41 (L) 1.00 - 4.80 K/uL    Monos (Absolute) 0.42 0.00 - 0.85 K/uL    Eos (Absolute) 0.01 0.00 - 0.51 K/uL    Baso (Absolute) 0.07 0.00 - 0.12 K/uL    Immature Granulocytes (abs) 0.01 0.00 - 0.11 K/uL    NRBC (Absolute) 0.00 K/uL   COMP METABOLIC PANEL   Result Value Ref Range    Sodium 136 135 - 145 mmol/L    Potassium 4.5 3.6 - 5.5 mmol/L    Chloride 89 (L) 96 - 112 mmol/L    Co2 21 20 - 33 mmol/L    Anion Gap 26.0 (H) 7.0 - 16.0    Glucose 112 (H) 65 - 99 mg/dL    Bun 10 8 - 22 mg/dL    Creatinine 0.61 0.50 - 1.40 mg/dL    Calcium 9.7 8.5 - 10.5 mg/dL    AST(SGOT) 433 (H) 12 - 45 U/L    ALT(SGPT) 192 (H) 2 - 50 U/L    Alkaline Phosphatase 157 (H) 30 - 99 U/L    Total Bilirubin 2.7 (H) 0.1 - 1.5 mg/dL    Albumin 4.7 3.2 - 4.9 g/dL    Total Protein 8.4 (H) 6.0 - 8.2 g/dL    Globulin 3.7 (H) 1.9 - 3.5 g/dL    A-G  Ratio 1.3 g/dL   URINALYSIS (UA)    Specimen: Urine   Result Value Ref Range    Color Orange (A)     Character Clear     Specific Gravity see below <1.035    Ph see below 5.0 - 8.0    Glucose see below Negative mg/dL    Ketones see below Negative mg/dL    Protein see below Negative mg/dL    Bilirubin see below Negative    Urobilinogen, Urine see below Negative    Nitrite see below Negative    Leukocyte Esterase see below Negative    Occult Blood see below Negative    Micro Urine Req Microscopic    LACTIC ACID   Result Value Ref Range    Lactic Acid 6.9 (HH) 0.5 - 2.0 mmol/L   LIPASE   Result Value Ref Range    Lipase 290 (H) 11 - 82 U/L   DIAGNOSTIC ALCOHOL   Result Value Ref Range    Diagnostic Alcohol 86.5 (H) 0.0 - 10.0 mg/dL   ESTIMATED GFR   Result Value Ref Range    GFR If African American >60 >60 mL/min/1.73 m 2    GFR If Non African American >60 >60 mL/min/1.73 m 2   URINE MICROSCOPIC (W/UA)   Result Value Ref Range    WBC 0-2 (A) /hpf    RBC 0-2 (A) /hpf    Bacteria Negative None /hpf    Epithelial Cells Negative /hpf    Mucous Threads Few /hpf    Hyaline Cast 0-2 /lpf   SARS-CoV-2 PCR (24 hour In-House): Collect NP swab in VTM    Specimen: Respirate   Result Value Ref Range    SARS-CoV-2 Source NP Swab     SARS-CoV-2 by PCR NotDetected    LACTIC ACID   Result Value Ref Range    Lactic Acid 5.7 (HH) 0.5 - 2.0 mmol/L   LACTIC ACID   Result Value Ref Range    Lactic Acid 5.0 (HH) 0.5 - 2.0 mmol/L   PT/INR   Result Value Ref Range    PT 14.8 (H) 12.0 - 14.6 sec    INR 1.13 0.87 - 1.13   AMYLASE   Result Value Ref Range    Amylase 111 (H) 20 - 103 U/L   proBrain Natriuretic Peptide, NT   Result Value Ref Range    NT-proBNP 17 0 - 125 pg/mL   PROCALCITONIN   Result Value Ref Range    Procalcitonin 0.08 <0.25 ng/mL   AMMONIA   Result Value Ref Range    Ammonia 37 11 - 45 umol/L   CBC without Differential   Result Value Ref Range    WBC 3.6 (L) 4.8 - 10.8 K/uL    RBC 3.88 (L) 4.70 - 6.10 M/uL    Hemoglobin 13.4  (L) 14.0 - 18.0 g/dL    Hematocrit 36.9 (L) 42.0 - 52.0 %    MCV 95.1 81.4 - 97.8 fL    MCH 34.5 (H) 27.0 - 33.0 pg    MCHC 36.3 (H) 33.7 - 35.3 g/dL    RDW 46.6 35.9 - 50.0 fL    Platelet Count 31 (LL) 164 - 446 K/uL    MPV 9.9 9.0 - 12.9 fL   D-DIMER   Result Value Ref Range    D-Dimer Screen 1.05 (H) 0.00 - 0.50 ug/mL (FEU)   Comp Metabolic Panel   Result Value Ref Range    Sodium 137 135 - 145 mmol/L    Potassium 4.4 3.6 - 5.5 mmol/L    Chloride 97 96 - 112 mmol/L    Co2 26 20 - 33 mmol/L    Anion Gap 14.0 7.0 - 16.0    Glucose 116 (H) 65 - 99 mg/dL    Bun 9 8 - 22 mg/dL    Creatinine 0.48 (L) 0.50 - 1.40 mg/dL    Calcium 8.3 (L) 8.5 - 10.5 mg/dL    AST(SGOT) 296 (H) 12 - 45 U/L    ALT(SGPT) 138 (H) 2 - 50 U/L    Alkaline Phosphatase 114 (H) 30 - 99 U/L    Total Bilirubin 2.7 (H) 0.1 - 1.5 mg/dL    Albumin 3.7 3.2 - 4.9 g/dL    Total Protein 6.3 6.0 - 8.2 g/dL    Globulin 2.6 1.9 - 3.5 g/dL    A-G Ratio 1.4 g/dL   MAGNESIUM   Result Value Ref Range    Magnesium 1.1 (L) 1.5 - 2.5 mg/dL   ESTIMATED GFR   Result Value Ref Range    GFR If African American >60 >60 mL/min/1.73 m 2    GFR If Non African American >60 >60 mL/min/1.73 m 2   BASIC TEG   Result Value Ref Range    Reaction Time Initial-R 8.1 5.0 - 10.0 min    Clot Kinetics-K 5.1 (H) 1.0 - 3.0 min    Clot Angle-Angle 42.3 (L) 53.0 - 72.0 degrees    Maximum Clot Strength-MA 38.7 (L) 50.0 - 70.0 mm    Lysis 30 minutes-LY30 0.0 0.0 - 8.0 %    TEG Algorithm Link Algorithm    LACTIC ACID   Result Value Ref Range    Lactic Acid 2.7 (H) 0.5 - 2.0 mmol/L   EKG   Result Value Ref Range    Report       [facility] Emergency Dept.    Test Date:  2021  Pt Name:    NANCY HINSON                Department: ER  MRN:        [id number]                      Room:       [facility]  Gender:     Male                         Technician: [id number]  :        1990                   Requested By:ABDULKADIR BOOTH  Order #:    [id number]                    Reading  MD:    Measurements  Intervals                                Axis  Rate:       85                           P:          56  OH:         128                          QRS:        60  QRSD:       90                           T:          40  QT:         400  QTc:        476    Interpretive Statements  SINUS ARRHYTHMIA, RATE  63- 96  BORDERLINE PROLONGED QT INTERVAL  No previous ECG available for comparison         Imaging:   DX-CHEST-LIMITED (1 VIEW)   Final Result         1.  No acute cardiopulmonary disease.      CT-ABDOMEN-PELVIS WITH   Final Result         1.  Mild cecal and ascending colonic wall thickening, appearance suggests focal segmental colitis.   2.  Nonvisualization of the appendix limits evaluation for and exclusion of appendicitis.   3.  Hepatomegaly and diffuse hepatic steatosis        CT abdomen pelvis w/ 3/21/2021.  Revealed hepatomegaly with hepatic steatosis, nonvisualization of the appendix Limited evaluation of appendicitis, also noted mild wall thickening of cecal and ascending colon suggestive focal segmental colitis, wall no pancreatic abnormality,      Problem Representation: 31-year-old male with past medical history of alcohol abuse, pancreatitis, alcoholic hepatitis presented with acute abdominal pain, nonradiating associated with nausea and vomiting, ongoing from past 4 to 5 days, reported no episode of loose stool.      Assessment and plan      #Abdominal pain, acute, epigastric  Differential diagnosis include alcohol induced acute pancreatitis, alcoholic gastritis, alcoholic hepatitis.  Based on of history, lab findings and physical examination and chart review the acute pancreatitis is higher in differential.  Amylase 111, lipase 290.  No nausea and vomiting reported, patient tolerating oral fluid, discontinued IV fluids.  Started on clear liquid and advance diet as tolerated.  Abdominal pain is improved since admission, was on fentanyl patch started by admitting team, switched to  ketorolac as needed as patient high in differential allergic to morphine.  Consider Meperidine prn if ABD pain not well controlled with current analgesia.   No sign and symptom of infectious process, discontinued antibiotics.  Monitor closely.   Recheck CBC, CMP in a.m.    #Alcohol abuse  This is an ongoing issue, no evidence of withdrawal.  Patient is somewhat denying awful, much he drinks.  Upon admission 3/20-blood alcohol level was 80  Counseling provided, also discussed starting on medical therapy for alcohol abuse.  Will discuss possible resources upon discharge     #Alcoholic hepatitis  Patient denied right upper quadrant pain, physical exam negative  LFTs are likely elevated alcohol hepatitis upon admission AST/ALT significantly elevated 433/192, ratio roughly 2:1, improved to 296/138.  CT abdomen shows hepatomegaly With hepatic steatosis.  Recheck LFTs  Follow with PCP upon discharge  Will provide resources that may  Help the pt quit drinking    #Metabolic acidosis likely from alcohol induced lactic acidosis   lactic acid on admission 6.9, given 1 L of LR bolus in the ED followed by continuous IV fluid.  Banana bag given upon admission  Lactic acid downtrending 2.7 this morning.  No signs and symptoms of infectious process.  Patient tolerating oral liquid.  Continue thiamine, multivitamin and folic acid

## 2021-03-21 NOTE — ED PROVIDER NOTES
ED Provider Note    Scribed for Abisai Pandey M.D. by Lise Pichardo. 3/20/2021,   10:56 PM    Primary care provider: Pcp Pt States None  Means of arrival: Walk in  History obtained from: Patient  History limited by: None    CHIEF COMPLAINT  Chief Complaint   Patient presents with   • N/V     x 5-6 days. diffuse abd pain.        HPI  Scot Ramírez is a 31 y.o. male who presents to the Emergency Department with intermittent nausea and vomiting onset 5-6 days ago. Per the patient, he can't keep anything down and is experiencing moderate pain across his abdomen; the pain does not radiate anywhere. The patient reports additional symptoms of chills and diaphoresis, and denies fevers, dysuria, shortness of breath, cough, sore throat, testicular pain, or blood present in his vomit. No other exacerbating or alleviating factors were noted. He also denies history of pancreatitis, abdominal surgeries, COVID-19 exposure, or drug use, and adds he drinks 0.5 pt of alcohol a day.     REVIEW OF SYSTEMS  Pertinent positives include nausea, vomiting, diffuse abdominal pain, chills, diaphoresis. Pertinent negatives include fevers, dysuria, shortness of breath, cough, sore throat, testicular pain, or blood present in his vomit. All other systems negative.    PAST MEDICAL HISTORY   has a past medical history of Asthma and Helicobacter pylori (H. pylori) infection.    SURGICAL HISTORY  patient denies any surgical history    SOCIAL HISTORY  Social History     Tobacco Use   • Smoking status: Current Every Day Smoker   • Smokeless tobacco: Never Used   Substance Use Topics   • Alcohol use: Yes   • Drug use: Yes     Comment: Cocaine occasionally      Social History     Substance and Sexual Activity   Drug Use Yes    Comment: Cocaine occasionally       FAMILY HISTORY  Family History   Family history unknown: Yes       CURRENT MEDICATIONS  Current Outpatient Medications   Medication Instructions   • albuterol 108 (90 Base) MCG/ACT Aero  "Soln inhalation aerosol 2 Puffs, Inhalation, EVERY 6 HOURS PRN     ALLERGIES  Allergies   Allergen Reactions   • Morphine Unspecified     Pt sts allergic rxn to morphine is hallucinations.    • Other Drug Swelling     GI Cocktail- \"throat swells\"   • Penicillins Anaphylaxis       PHYSICAL EXAM  VITAL SIGNS: BP (!) 162/99   Pulse (!) 115   Temp 36.2 °C (97.2 °F) (Temporal)   Resp 17   SpO2 94%     Constitutional: Well developed, Well nourished, Moderate distress.   HENT: Normocephalic, Atraumatic, mask in place.  Eyes: Conjunctiva normal, No discharge.   Neck: Supple, No stridor   Cardiovascular: Tachycardic heart rate, Normal rhythm, No murmurs, equal pulses.   Pulmonary: Lungs clear to auscultation bilaterally, normal breath sounds, No respiratory distress, No wheezing, No rales, No rhonchi.  Chest: No chest wall tenderness or deformity.   Abdomen:Soft, Diffuse tenderness across the abdomen, greatest over McBurney's point with guarding, negative Rovsing's sign, Negative Obturator's sign, Negative Psoas sign, No masses, no rebound   Back: No CVA tenderness.   Musculoskeletal: No major deformities noted, No tenderness.   Skin: Warm, Dry, No erythema, No rash.   Neurologic: Alert & oriented x 3, Normal motor function,  No focal deficits noted.   Psychiatric: Affect normal, Judgment normal, Mood normal.     LABS  Results for orders placed or performed during the hospital encounter of 03/20/21   CBC WITH DIFFERENTIAL   Result Value Ref Range    WBC 4.4 (L) 4.8 - 10.8 K/uL    RBC 4.98 4.70 - 6.10 M/uL    Hemoglobin 17.3 14.0 - 18.0 g/dL    Hematocrit 47.1 42.0 - 52.0 %    MCV 94.6 81.4 - 97.8 fL    MCH 34.7 (H) 27.0 - 33.0 pg    MCHC 36.7 (H) 33.7 - 35.3 g/dL    RDW 45.9 35.9 - 50.0 fL    Platelet Count 51 (L) 164 - 446 K/uL    MPV 10.6 9.0 - 12.9 fL    Neutrophils-Polys 79.00 (H) 44.00 - 72.00 %    Lymphocytes 9.40 (L) 22.00 - 41.00 %    Monocytes 9.60 0.00 - 13.40 %    Eosinophils 0.20 0.00 - 6.90 %    Basophils " 1.60 0.00 - 1.80 %    Immature Granulocytes 0.20 0.00 - 0.90 %    Nucleated RBC 0.00 /100 WBC    Neutrophils (Absolute) 3.45 1.82 - 7.42 K/uL    Lymphs (Absolute) 0.41 (L) 1.00 - 4.80 K/uL    Monos (Absolute) 0.42 0.00 - 0.85 K/uL    Eos (Absolute) 0.01 0.00 - 0.51 K/uL    Baso (Absolute) 0.07 0.00 - 0.12 K/uL    Immature Granulocytes (abs) 0.01 0.00 - 0.11 K/uL    NRBC (Absolute) 0.00 K/uL   COMP METABOLIC PANEL   Result Value Ref Range    Sodium 136 135 - 145 mmol/L    Potassium 4.5 3.6 - 5.5 mmol/L    Chloride 89 (L) 96 - 112 mmol/L    Co2 21 20 - 33 mmol/L    Anion Gap 26.0 (H) 7.0 - 16.0    Glucose 112 (H) 65 - 99 mg/dL    Bun 10 8 - 22 mg/dL    Creatinine 0.61 0.50 - 1.40 mg/dL    Calcium 9.7 8.5 - 10.5 mg/dL    AST(SGOT) 433 (H) 12 - 45 U/L    ALT(SGPT) 192 (H) 2 - 50 U/L    Alkaline Phosphatase 157 (H) 30 - 99 U/L    Total Bilirubin 2.7 (H) 0.1 - 1.5 mg/dL    Albumin 4.7 3.2 - 4.9 g/dL    Total Protein 8.4 (H) 6.0 - 8.2 g/dL    Globulin 3.7 (H) 1.9 - 3.5 g/dL    A-G Ratio 1.3 g/dL   URINALYSIS (UA)    Specimen: Urine   Result Value Ref Range    Color Orange (A)     Character Clear     Specific Gravity see below <1.035    Ph see below 5.0 - 8.0    Glucose see below Negative mg/dL    Ketones see below Negative mg/dL    Protein see below Negative mg/dL    Bilirubin see below Negative    Urobilinogen, Urine see below Negative    Nitrite see below Negative    Leukocyte Esterase see below Negative    Occult Blood see below Negative    Micro Urine Req Microscopic    LACTIC ACID   Result Value Ref Range    Lactic Acid 6.9 (HH) 0.5 - 2.0 mmol/L   LIPASE   Result Value Ref Range    Lipase 290 (H) 11 - 82 U/L   DIAGNOSTIC ALCOHOL   Result Value Ref Range    Diagnostic Alcohol 86.5 (H) 0.0 - 10.0 mg/dL   ESTIMATED GFR   Result Value Ref Range    GFR If African American >60 >60 mL/min/1.73 m 2    GFR If Non African American >60 >60 mL/min/1.73 m 2   URINE MICROSCOPIC (W/UA)   Result Value Ref Range    WBC 0-2 (A) /hpf     RBC 0-2 (A) /hpf    Bacteria Negative None /hpf    Epithelial Cells Negative /hpf    Mucous Threads Few /hpf    Hyaline Cast 0-2 /lpf   SARS-CoV-2 PCR (24 hour In-House): Collect NP swab in [facility]    Specimen: Respirate   Result Value Ref Range    SARS-CoV-2 Source NP Swab    LACTIC ACID   Result Value Ref Range    Lactic Acid 5.7 (HH) 0.5 - 2.0 mmol/L     All labs reviewed by me.      RADIOLOGY  CT-ABDOMEN-PELVIS WITH    (Results Pending)     The radiologist's interpretation of all radiological studies have been reviewed by me.    COURSE & MEDICAL DECISION MAKING  Pertinent Labs & Imaging studies reviewed. (See chart for details)    10:31 PM - Patient seen and examined at bedside. Discussed plans to order labs and imaging to evaluate. Patient verbalizes understanding and agreement to this plan of care. Patient will be treated with NS infusion 1000 mL, Zofran 4 mg, Reglan 10 mg, Sublimaze 50 mcg, and Ativan 1 mg. The patient will receive IV fluids for vomiting. Ordered CT-abdomen-pelvis w/, Diagnostic alcohol, CMP, UA, Lactic acid, Lipase, and CBC w/diff to evaluate his symptoms. The differential diagnoses include but are not limited to: acute appendicitis, pancreatitis, electrolyte abnormality, gastroenteritis, COVID-19     Patient's lactic acid slightly improved with IV fluids.  Patient is turned over at 1:50 AM pending CT scan to Dr. Caal.  I am concerned that the patient has possible acute appendicitis versus acute pancreatitis.  He will make final disposition    Medical Decision Making: Patient presents with severe vomiting and right lower quadrant pain.  Initially I was concerned about acute appendicitis therefore CT of the abdomen pelvis is been ordered.  Patient also appears to be acute pancreatitis with elevated liver enzymes as well as elevated lactic acid.  He has been given antibiotics as well as IV fluid bolus for sepsis.  At this point time CT is still pending and this is needed to see if the patient  will need surgical intervention.        FINAL IMPRESSION  1. Sepsis, due to unspecified organism, unspecified whether acute organ dysfunction present (HCC)    2. Non-intractable vomiting with nausea, unspecified vomiting type    3. Alcohol-induced acute pancreatitis, unspecified complication status    4. Right lower quadrant abdominal pain    5. Alcohol abuse          Lise KIRK (Scribe), am scribing for, and in the presence of, Abisai Pandey M.D.    Electronically signed by: Lise Pichardo (Scribe), 3/20/2021    IAbisai M.D. personally performed the services described in this documentation, as scribed by Lise Pichardo in my presence, and it is both accurate and complete.    C    The note accurately reflects work and decisions made by me.  Abisai Pandey M.D.  3/21/2021  1:58 AM

## 2021-03-21 NOTE — ASSESSMENT & PLAN NOTE
2/3 critieria (Lipase 290, s/s c/w pancreatitis, CT findings not consistent). Amylate 111. Improved.  -advance diet  -IVF stopped  -pain and nausea resolved  -patient referred for ETOH rehab services  -discharge home with close outpatient f/u with PCP

## 2021-03-21 NOTE — ED NOTES
Report from ISAAK Riojas. Assumed care of pt, pt is resting in gurney, respirations even and unlabored. Able to speak in complete sentences, currently receiving IV abx. NAD noted.

## 2021-03-21 NOTE — DISCHARGE PLANNING
Medical Social Work    MSW received a call from bedside RN that pt is interested in alcohol detox resources; pt was just admitted to the floor.  Report left for unit SW/CM for follow up this morning.

## 2021-03-21 NOTE — ED NOTES
Med Rec complete per Pt at bedside.  Allergies reviewed  Pt states no oral ABX in the last 14 days.

## 2021-03-21 NOTE — PROGRESS NOTES
Skin Check    Skin CDI w/ no s/s of redness/breakdown/infection to the trunk, abdomen, back, nor either extremity x4. Head assessed with negative findings. Will continue to monitor and assess.

## 2021-03-21 NOTE — ED PROVIDER NOTES
ED Provider Note    02:00am Assumed care from Dr. Pandey. 31 year old man presents with n/v, abdominal pain at RLQ. High lactic acid initially.      3:02 AM  CT does not show obvious appendicitis. There are findings consistent with colitis. With elevated lactic acid of 5 after 3 liters of fluid, I have considered this as possibly being consistent with ischemic colitis. However, I suspect persistent elevated lactic acid from liver dysfunction (chronic alcohol abuse). I have re-examined him and he has no guarding or peritoneal signs at his abdomen. Will consult with hospitalist team for hospitalization.     Dr. White, hospitalist agreed to evaluate him.  We also contacted the ICU, Dr. Santos, to evaluate him as well because of the elevated lactic acid, evaluate to see if he is appropriate for the floor.    Benjamín Caal II, M.D. 3/21/2021 7:12 AM

## 2021-03-21 NOTE — ASSESSMENT & PLAN NOTE
Downtrending, AST/ALT ratio >2. Maddrey's Discriminant Function : 8.2 = good prognosis, no indication for prednisone.  upon admission AST/ALT significantly elevated 433/192, ratio roughly 2:1, improved to 296/138.  -CT abdomen shows hepatomegaly With hepatic steatosis.  -Follow with PCP upon discharge  -Will provide resources that may  Help the pt quit drinking

## 2021-03-21 NOTE — ASSESSMENT & PLAN NOTE
This is Sepsis Present on admission  SIRS criteria identified on my evaluation include: Fever, with temperature greater than 101 deg F, Tachycardia, with heart rate greater than 90 BPM and Leukocytosis, with WBC greater than 12,000  Source is Unknown  Sepsis protocol initiated  Fluid resuscitation ordered per protocol  IV antibiotics as appropriate for source of sepsis  While organ dysfunction may be noted elsewhere in this problem list or in the chart, degree of organ dysfunction does not meet CMS criteria for severe sepsis    BCx, CXR, Procal pending,  UA negative  CTAP  Levaquin/Flagyl

## 2021-03-22 ENCOUNTER — PATIENT OUTREACH (OUTPATIENT)
Dept: HEALTH INFORMATION MANAGEMENT | Facility: OTHER | Age: 31
End: 2021-03-22

## 2021-03-22 PROBLEM — E83.39 HYPOPHOSPHATEMIA: Status: ACTIVE | Noted: 2021-03-22

## 2021-03-22 PROBLEM — E83.42 HYPOMAGNESEMIA: Status: ACTIVE | Noted: 2021-03-22

## 2021-03-22 PROBLEM — A41.9 SEPSIS (HCC): Status: RESOLVED | Noted: 2021-03-21 | Resolved: 2021-03-22

## 2021-03-22 PROBLEM — J45.20 ASTHMA, MILD INTERMITTENT: Status: ACTIVE | Noted: 2021-03-22

## 2021-03-22 PROBLEM — E87.29 METABOLIC ACIDOSIS, INCREASED ANION GAP: Status: ACTIVE | Noted: 2021-03-22

## 2021-03-22 LAB
ALBUMIN SERPL BCP-MCNC: 3.8 G/DL (ref 3.2–4.9)
ALBUMIN/GLOB SERPL: 1.4 G/DL
ALP SERPL-CCNC: 123 U/L (ref 30–99)
ALT SERPL-CCNC: 123 U/L (ref 2–50)
ANION GAP SERPL CALC-SCNC: 11 MMOL/L (ref 7–16)
AST SERPL-CCNC: 282 U/L (ref 12–45)
BASOPHILS # BLD AUTO: 0.5 % (ref 0–1.8)
BASOPHILS # BLD: 0.02 K/UL (ref 0–0.12)
BILIRUB SERPL-MCNC: 5.4 MG/DL (ref 0.1–1.5)
BUN SERPL-MCNC: 3 MG/DL (ref 8–22)
CALCIUM SERPL-MCNC: 9.3 MG/DL (ref 8.5–10.5)
CHLORIDE SERPL-SCNC: 92 MMOL/L (ref 96–112)
CHOLEST SERPL-MCNC: 148 MG/DL (ref 100–199)
CO2 SERPL-SCNC: 24 MMOL/L (ref 20–33)
CREAT SERPL-MCNC: 0.46 MG/DL (ref 0.5–1.4)
EOSINOPHIL # BLD AUTO: 0.1 K/UL (ref 0–0.51)
EOSINOPHIL NFR BLD: 2.3 % (ref 0–6.9)
ERYTHROCYTE [DISTWIDTH] IN BLOOD BY AUTOMATED COUNT: 46.3 FL (ref 35.9–50)
FOLATE SERPL-MCNC: 7.5 NG/ML
GLOBULIN SER CALC-MCNC: 2.8 G/DL (ref 1.9–3.5)
GLUCOSE SERPL-MCNC: 100 MG/DL (ref 65–99)
H PYLORI AG STL QL IA: NOT DETECTED
HAV IGM SERPL QL IA: NORMAL
HBV CORE IGM SER QL: NORMAL
HBV SURFACE AG SER QL: NORMAL
HCT VFR BLD AUTO: 39.4 % (ref 42–52)
HCV AB SER QL: NORMAL
HDLC SERPL-MCNC: 57 MG/DL
HGB BLD-MCNC: 14.1 G/DL (ref 14–18)
HIV 1+2 AB+HIV1 P24 AG SERPL QL IA: NORMAL
IMM GRANULOCYTES # BLD AUTO: 0.01 K/UL (ref 0–0.11)
IMM GRANULOCYTES NFR BLD AUTO: 0.2 % (ref 0–0.9)
LACTATE BLD-SCNC: 1.4 MMOL/L (ref 0.5–2)
LDLC SERPL CALC-MCNC: 78 MG/DL
LYMPHOCYTES # BLD AUTO: 0.82 K/UL (ref 1–4.8)
LYMPHOCYTES NFR BLD: 19.1 % (ref 22–41)
MAGNESIUM SERPL-MCNC: 1.5 MG/DL (ref 1.5–2.5)
MAGNESIUM SERPL-MCNC: 2.6 MG/DL (ref 1.5–2.5)
MCH RBC QN AUTO: 34.2 PG (ref 27–33)
MCHC RBC AUTO-ENTMCNC: 35.8 G/DL (ref 33.7–35.3)
MCV RBC AUTO: 95.6 FL (ref 81.4–97.8)
MONOCYTES # BLD AUTO: 0.32 K/UL (ref 0–0.85)
MONOCYTES NFR BLD AUTO: 7.5 % (ref 0–13.4)
NEUTROPHILS # BLD AUTO: 3.02 K/UL (ref 1.82–7.42)
NEUTROPHILS NFR BLD: 70.4 % (ref 44–72)
NRBC # BLD AUTO: 0 K/UL
NRBC BLD-RTO: 0 /100 WBC
OSMOLALITY SERPL: 277 MOSM/KG H2O (ref 278–298)
OSMOLALITY UR: 277 MOSM/KG H2O (ref 300–900)
PHOSPHATE SERPL-MCNC: 1.3 MG/DL (ref 2.5–4.5)
PHOSPHATE SERPL-MCNC: 2.6 MG/DL (ref 2.5–4.5)
PLATELET # BLD AUTO: 22 K/UL (ref 164–446)
PMV BLD AUTO: 11.5 FL (ref 9–12.9)
POTASSIUM SERPL-SCNC: 4 MMOL/L (ref 3.6–5.5)
PROT SERPL-MCNC: 6.6 G/DL (ref 6–8.2)
RBC # BLD AUTO: 4.12 M/UL (ref 4.7–6.1)
SODIUM SERPL-SCNC: 127 MMOL/L (ref 135–145)
SODIUM UR-SCNC: 86 MMOL/L
TRIGL SERPL-MCNC: 65 MG/DL (ref 0–149)
WBC # BLD AUTO: 4.3 K/UL (ref 4.8–10.8)

## 2021-03-22 PROCEDURE — 94760 N-INVAS EAR/PLS OXIMETRY 1: CPT

## 2021-03-22 PROCEDURE — 99233 SBSQ HOSP IP/OBS HIGH 50: CPT | Performed by: HOSPITALIST

## 2021-03-22 PROCEDURE — 700111 HCHG RX REV CODE 636 W/ 250 OVERRIDE (IP): Performed by: STUDENT IN AN ORGANIZED HEALTH CARE EDUCATION/TRAINING PROGRAM

## 2021-03-22 PROCEDURE — 83735 ASSAY OF MAGNESIUM: CPT

## 2021-03-22 PROCEDURE — 700102 HCHG RX REV CODE 250 W/ 637 OVERRIDE(OP): Performed by: STUDENT IN AN ORGANIZED HEALTH CARE EDUCATION/TRAINING PROGRAM

## 2021-03-22 PROCEDURE — 700101 HCHG RX REV CODE 250: Performed by: GENERAL PRACTICE

## 2021-03-22 PROCEDURE — 94640 AIRWAY INHALATION TREATMENT: CPT

## 2021-03-22 PROCEDURE — 83930 ASSAY OF BLOOD OSMOLALITY: CPT

## 2021-03-22 PROCEDURE — 84100 ASSAY OF PHOSPHORUS: CPT

## 2021-03-22 PROCEDURE — 82746 ASSAY OF FOLIC ACID SERUM: CPT

## 2021-03-22 PROCEDURE — 80061 LIPID PANEL: CPT

## 2021-03-22 PROCEDURE — 80074 ACUTE HEPATITIS PANEL: CPT

## 2021-03-22 PROCEDURE — 770020 HCHG ROOM/CARE - TELE (206)

## 2021-03-22 PROCEDURE — A9270 NON-COVERED ITEM OR SERVICE: HCPCS | Performed by: STUDENT IN AN ORGANIZED HEALTH CARE EDUCATION/TRAINING PROGRAM

## 2021-03-22 PROCEDURE — 80053 COMPREHEN METABOLIC PANEL: CPT

## 2021-03-22 PROCEDURE — 700111 HCHG RX REV CODE 636 W/ 250 OVERRIDE (IP): Performed by: GENERAL PRACTICE

## 2021-03-22 PROCEDURE — 82607 VITAMIN B-12: CPT

## 2021-03-22 PROCEDURE — 700105 HCHG RX REV CODE 258: Performed by: STUDENT IN AN ORGANIZED HEALTH CARE EDUCATION/TRAINING PROGRAM

## 2021-03-22 PROCEDURE — 83605 ASSAY OF LACTIC ACID: CPT

## 2021-03-22 PROCEDURE — 87389 HIV-1 AG W/HIV-1&-2 AB AG IA: CPT

## 2021-03-22 PROCEDURE — 700105 HCHG RX REV CODE 258: Performed by: GENERAL PRACTICE

## 2021-03-22 PROCEDURE — 36415 COLL VENOUS BLD VENIPUNCTURE: CPT

## 2021-03-22 PROCEDURE — 87338 HPYLORI STOOL AG IA: CPT

## 2021-03-22 PROCEDURE — 85025 COMPLETE CBC W/AUTO DIFF WBC: CPT

## 2021-03-22 PROCEDURE — 83935 ASSAY OF URINE OSMOLALITY: CPT

## 2021-03-22 PROCEDURE — 84443 ASSAY THYROID STIM HORMONE: CPT

## 2021-03-22 PROCEDURE — 84300 ASSAY OF URINE SODIUM: CPT

## 2021-03-22 RX ORDER — LEVALBUTEROL INHALATION SOLUTION 1.25 MG/3ML
1.25 SOLUTION RESPIRATORY (INHALATION)
Status: DISCONTINUED | OUTPATIENT
Start: 2021-03-22 | End: 2021-03-23 | Stop reason: HOSPADM

## 2021-03-22 RX ORDER — ONDANSETRON 4 MG/1
4 TABLET, ORALLY DISINTEGRATING ORAL ONCE
Status: COMPLETED | OUTPATIENT
Start: 2021-03-22 | End: 2021-03-22

## 2021-03-22 RX ORDER — PROCHLORPERAZINE EDISYLATE 5 MG/ML
5 INJECTION INTRAMUSCULAR; INTRAVENOUS EVERY 6 HOURS PRN
Status: DISCONTINUED | OUTPATIENT
Start: 2021-03-22 | End: 2021-03-23 | Stop reason: HOSPADM

## 2021-03-22 RX ORDER — POTASSIUM CHLORIDE 20 MEQ/1
40 TABLET, EXTENDED RELEASE ORAL ONCE
Status: COMPLETED | OUTPATIENT
Start: 2021-03-22 | End: 2021-03-22

## 2021-03-22 RX ORDER — MAGNESIUM SULFATE HEPTAHYDRATE 40 MG/ML
4 INJECTION, SOLUTION INTRAVENOUS ONCE
Status: COMPLETED | OUTPATIENT
Start: 2021-03-22 | End: 2021-03-22

## 2021-03-22 RX ADMIN — THERA TABS 1 TABLET: TAB at 05:23

## 2021-03-22 RX ADMIN — IPRATROPIUM BROMIDE 0.5 MG: 0.5 SOLUTION RESPIRATORY (INHALATION) at 13:50

## 2021-03-22 RX ADMIN — DOCUSATE SODIUM 50 MG AND SENNOSIDES 8.6 MG 2 TABLET: 8.6; 5 TABLET, FILM COATED ORAL at 17:35

## 2021-03-22 RX ADMIN — MAGNESIUM SULFATE IN WATER 4 G: 40 INJECTION, SOLUTION INTRAVENOUS at 07:37

## 2021-03-22 RX ADMIN — LORAZEPAM 2 MG: 2 TABLET ORAL at 12:49

## 2021-03-22 RX ADMIN — CHLORPROMAZINE HYDROCHLORIDE 25 MG: 25 INJECTION INTRAMUSCULAR at 05:39

## 2021-03-22 RX ADMIN — Medication 100 MG: at 05:21

## 2021-03-22 RX ADMIN — POTASSIUM PHOSPHATE, MONOBASIC AND POTASSIUM PHOSPHATE, DIBASIC 15 MMOL: 224; 236 INJECTION, SOLUTION, CONCENTRATE INTRAVENOUS at 08:17

## 2021-03-22 RX ADMIN — FOLIC ACID 1 MG: 1 TABLET ORAL at 05:23

## 2021-03-22 RX ADMIN — KETOROLAC TROMETHAMINE 30 MG: 30 INJECTION, SOLUTION INTRAMUSCULAR; INTRAVENOUS at 05:39

## 2021-03-22 RX ADMIN — IPRATROPIUM BROMIDE 0.5 MG: 0.5 SOLUTION RESPIRATORY (INHALATION) at 10:21

## 2021-03-22 RX ADMIN — POTASSIUM CHLORIDE 40 MEQ: 1500 TABLET, EXTENDED RELEASE ORAL at 01:01

## 2021-03-22 RX ADMIN — ONDANSETRON 4 MG: 4 TABLET, ORALLY DISINTEGRATING ORAL at 17:35

## 2021-03-22 ASSESSMENT — LIFESTYLE VARIABLES
AGITATION: NORMAL ACTIVITY
TOTAL SCORE: 4
VISUAL DISTURBANCES: NOT PRESENT
HEADACHE, FULLNESS IN HEAD: NOT PRESENT
ORIENTATION AND CLOUDING OF SENSORIUM: CANNOT DO SERIAL ADDITIONS OR IS UNCERTAIN ABOUT DATE
NAUSEA AND VOMITING: *
PAROXYSMAL SWEATS: NO SWEAT VISIBLE
AGITATION: SOMEWHAT MORE THAN NORMAL ACTIVITY
PAROXYSMAL SWEATS: BEADS OF SWEAT OBVIOUS ON FOREHEAD
AGITATION: NORMAL ACTIVITY
ANXIETY: NO ANXIETY (AT EASE)
ANXIETY: NO ANXIETY (AT EASE)
PAROXYSMAL SWEATS: NO SWEAT VISIBLE
AUDITORY DISTURBANCES: NOT PRESENT
AUDITORY DISTURBANCES: NOT PRESENT
TREMOR: NO TREMOR
VISUAL DISTURBANCES: MODERATELY SEVERE HALLUCINATIONS
HEADACHE, FULLNESS IN HEAD: NOT PRESENT
NAUSEA AND VOMITING: NO NAUSEA AND NO VOMITING
ORIENTATION AND CLOUDING OF SENSORIUM: CANNOT DO SERIAL ADDITIONS OR IS UNCERTAIN ABOUT DATE
NAUSEA AND VOMITING: NO NAUSEA AND NO VOMITING
AGITATION: NORMAL ACTIVITY
VISUAL DISTURBANCES: MILD SENSITIVITY
PAROXYSMAL SWEATS: NO SWEAT VISIBLE
ANXIETY: MODERATELY ANXIOUS OR GUARDED, SO ANXIETY IS INFERRED
AUDITORY DISTURBANCES: NOT PRESENT
TREMOR: TREMOR NOT VISIBLE BUT CAN BE FELT, FINGERTIP TO FINGERTIP
TOTAL SCORE: 7
AUDITORY DISTURBANCES: NOT PRESENT
PAROXYSMAL SWEATS: NO SWEAT VISIBLE
AGITATION: NORMAL ACTIVITY
ANXIETY: NO ANXIETY (AT EASE)
AGITATION: SOMEWHAT MORE THAN NORMAL ACTIVITY
NAUSEA AND VOMITING: NO NAUSEA AND NO VOMITING
ORIENTATION AND CLOUDING OF SENSORIUM: ORIENTED AND CAN DO SERIAL ADDITIONS
TOTAL SCORE: 4
TOTAL SCORE: 2
TREMOR: *
ORIENTATION AND CLOUDING OF SENSORIUM: ORIENTED AND CAN DO SERIAL ADDITIONS
ANXIETY: NO ANXIETY (AT EASE)
TOTAL SCORE: 1
HEADACHE, FULLNESS IN HEAD: NOT PRESENT
NAUSEA AND VOMITING: NO NAUSEA AND NO VOMITING
TOTAL SCORE: 4
HEADACHE, FULLNESS IN HEAD: VERY MILD
TREMOR: NO TREMOR
ORIENTATION AND CLOUDING OF SENSORIUM: ORIENTED AND CAN DO SERIAL ADDITIONS
ANXIETY: *
TREMOR: *
NAUSEA AND VOMITING: NO NAUSEA AND NO VOMITING
HEADACHE, FULLNESS IN HEAD: NOT PRESENT
VISUAL DISTURBANCES: MILD SENSITIVITY
PAROXYSMAL SWEATS: NO SWEAT VISIBLE
TREMOR: NO TREMOR
NAUSEA AND VOMITING: NO NAUSEA AND NO VOMITING
VISUAL DISTURBANCES: NOT PRESENT
VISUAL DISTURBANCES: NOT PRESENT
AGITATION: NORMAL ACTIVITY
ORIENTATION AND CLOUDING OF SENSORIUM: DATE DISORIENTATION BY NO MORE THAN TWO CALENDAR DAYS
HEADACHE, FULLNESS IN HEAD: NOT PRESENT
HEADACHE, FULLNESS IN HEAD: VERY MILD
TREMOR: TREMOR NOT VISIBLE BUT CAN BE FELT, FINGERTIP TO FINGERTIP
ANXIETY: NO ANXIETY (AT EASE)
AUDITORY DISTURBANCES: NOT PRESENT
AUDITORY DISTURBANCES: NOT PRESENT
VISUAL DISTURBANCES: NOT PRESENT
AUDITORY DISTURBANCES: NOT PRESENT
PAROXYSMAL SWEATS: NO SWEAT VISIBLE
ORIENTATION AND CLOUDING OF SENSORIUM: ORIENTED AND CAN DO SERIAL ADDITIONS
TOTAL SCORE: 13

## 2021-03-22 ASSESSMENT — ENCOUNTER SYMPTOMS
WEAKNESS: 0
MYALGIAS: 0
COUGH: 0
FEVER: 0
PALPITATIONS: 0
MUSCULOSKELETAL NEGATIVE: 1
SHORTNESS OF BREATH: 0
CONSTITUTIONAL NEGATIVE: 1
DIZZINESS: 0
DOUBLE VISION: 0
VOMITING: 0
RESPIRATORY NEGATIVE: 1
ABDOMINAL PAIN: 0
HEARTBURN: 0
CARDIOVASCULAR NEGATIVE: 1
DIARRHEA: 0
CONSTIPATION: 0
NAUSEA: 0
BRUISES/BLEEDS EASILY: 0
NEUROLOGICAL NEGATIVE: 1
GASTROINTESTINAL NEGATIVE: 1

## 2021-03-22 ASSESSMENT — FIBROSIS 4 INDEX: FIB4 SCORE: 36.98

## 2021-03-22 ASSESSMENT — PAIN DESCRIPTION - PAIN TYPE
TYPE: ACUTE PAIN
TYPE: ACUTE PAIN

## 2021-03-22 NOTE — PROGRESS NOTES
Critical Lab notification   MD Destin Decker returned call at 0309 on 3/22/21 and received report of declining platelet level indicated at 22 down from 25. No new orders received at this time.
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
## 2021-03-22 NOTE — CARE PLAN
Problem: Safety  Goal: Will remain free from injury  Outcome: PROGRESSING AS EXPECTED  Note: Personal items and call light within reach of pt, pt instructed to call for assistance, pt A&Ox4, condom cath applied by day RN d/t pt stability and HR, pt thus far has been complient with instructions, adequate lighting, hourly rounding, pain controlled and improving per patient, will continue to monitor and assess as the night progresses  Goal: Will remain free from falls  Outcome: PROGRESSING AS EXPECTED  Note: Personal items and call light within reach of pt, pt instructed to call for assistance, pt A&Ox4, condom cath applied by day RN d/t pt stability and HR, pt thus far has been complient with instructions, adequate lighting, hourly rounding, pain controlled and improving per patient, will continue to monitor and assess as the night progresses

## 2021-03-22 NOTE — PROGRESS NOTES
"Performed a CIWA early. Raymond called me with concerns. She said, \" I was on the phone with him and he was slurring his words and he told me he was having hallucinations.\" I told her that I would go in an re-assess him for withdrawal symptoms. Patient had visible sweat on forehead, was disoriented and was confirming the hallucinations. I checked his oxygen and he was 94% on room air. Medicated appropriately to CIWA protocol. Will perform the next CIWA in 2 hrs.  "

## 2021-03-22 NOTE — DISCHARGE PLANNING
LSW met with patient at bedside to complete CTT assessment.    Patient reports being independent with ADLs and IADLs. Patient does not have a PCP as he recently moved back to [place]. Patient lives with his steven Whaley and works at [employer]. Patient reports that his drinking may be attributing to his medical issues. LSW provided patient with alcohol use resources as patient states interest in outpatient services. Patient is uninsured. Patient declines PFA screening for medicaid and plans to get insurance through his job at [employer]. Patient reports he has to be employed for 3 months to be eligible for insurance and has been working for about a month.     Care Transition Team Assessment    Information Source  Orientation : Oriented x 4  Information Given By: Patient  Who is responsible for making decisions for patient? : Patient    Readmission Evaluation  Is this a readmission?: No    Elopement Risk  Legal Hold: No  Ambulatory or Self Mobile in Wheelchair: Yes  Disoriented: No  Psychiatric Symptoms: None  History of Wandering: No  Elopement this Admit: No  Vocalizing Wanting to Leave: No  Displays Behaviors, Body Language Wanting to Leave: No-Not at Risk for Elopement  Elopement Risk: Not at Risk for Elopement    Interdisciplinary Discharge Planning  Lives with - Patient's Self Care Capacity: Other (Comments)(Fiance)  Patient or legal guardian wants to designate a caregiver: Yes  Caregiver name: Veto Parekh  Caregiver contact info: [phone]  Support Systems: Spouse / Significant Other  Housing / Facility: Other (Comments)([facility])  Durable Medical Equipment: Not Applicable    Discharge Preparedness  What is your plan after discharge?: Home with help  What are your discharge supports?: Other (comment)(steven Jovana)  Prior Functional Level: Ambulatory, Independent with Activities of Daily Living, Independent with Medication Management  Difficulity with ADLs: None  Difficulity with IADLs: None    Functional  Assesment  Prior Functional Level: Ambulatory, Independent with Activities of Daily Living, Independent with Medication Management    Finances  Financial Barriers to Discharge: No  Prescription Coverage: No  Prescription Coverage Comments: (uninsured)    Vision / Hearing Impairment  Vision Impairment : Yes  Right Eye Vision: Wears Glasses  Left Eye Vision: Wears Glasses  Hearing Impairment : No         Advance Directive  Advance Directive?: None  Advance Directive offered?: AD Booklet refused    Domestic Abuse  Have you ever been the victim of abuse or violence?: No  Physical Abuse or Sexual Abuse: No  Verbal Abuse or Emotional Abuse: No  Possible Abuse/Neglect Reported to:: Not Applicable    Psychological Assessment  History of Substance Abuse: Alcohol  Date Last Used - Alcohol: (3/20)  Substance Abuse Comments: patient says his abdominal bleed could have been caused by his drnkin  History of Psychiatric Problems: No  Non-compliant with Treatment: No  Newly Diagnosed Illness: Yes    Discharge Risks or Barriers  Discharge risks or barriers?: No PCP, Uninsured / underinsured, Substance abuse  Patient risk factors: Substance abuse, Uninsured or underinsured    Anticipated Discharge Information  Discharge Disposition: Discharged to home/self care (01)

## 2021-03-22 NOTE — PROGRESS NOTES
Community Health Worker Intake  • Social determinates of health intake complete.   • Identified barriers to housing (paying for rent), and food.  • Contact information provided to Scot Ramírez.  • Inpatient assessment completed.    CHW Ranulfo met with pt bedside to introduce CCM services. No PCP, pt recently moved to [place]. Educated pt on the importance of follow ups. Pt works at [employer] and will be getting insurance through them. CHW discussed different community PCP's. Pt expressed barriers to paying for rent and food. Informed pt about [facility] food pantry. CHW discussed rent assistance resources; [employer] and [employer]. Pt lives in an apartment with steven. Pt reports no other needs at this time.    Plan: Follow up call post discharge. Provide foodrx, rent assistance resources.

## 2021-03-22 NOTE — CARE PLAN
Problem: Psychosocial Needs:  Goal: Level of anxiety will decrease  Outcome: PROGRESSING AS EXPECTED     Patient seems to be calm but has moments of anxiety. Seems to get nauseated more with the anxiety. Will educate on deep breathing and relaxation techniques.        Problem: Urinary Elimination:  Goal: Ability to reestablish a normal urinary elimination pattern will improve  Outcome: PROGRESSING AS EXPECTED    Patient has condom cath in place and is urinating frequently into the drainage bag. Patient also drinks a lot of water. Will monitor input and output.

## 2021-03-22 NOTE — PROGRESS NOTES
Assumed Care    Bedside report received w/ pt in attendance and no s/s of worsening condition. Questions answered. No expressed concerns at this time. Updated regarding POC. Will continue to monitor and assess as the night progress.

## 2021-03-22 NOTE — PROGRESS NOTES
Daily Progress Note:     Date of Service: 3/22/2021  Primary Team: [facility]   Attending: Jyoti Nava M.D.   Senior Resident: Dr. Pierre  Intern: Dr. Yadav  Contact:  [phone]    ID:  31 year old male with a history of alcohol abuse, admitted for epigastric pain associated with nausea and vomiting and diagnosed with Alcohol induced pancreatitis, Alcohol Hepatitis    Chief Complaint: abdominal pain, nausea with vomiting      Subjective: Overnight, the patient denies pain, nausea, vomiting, shortness of breath. Tolerating clear liquids, having loose stools, no issues with voiding and ambulating without issue. Is interested in rehab services for alcohol abuse.      Interval Update:       Consultants/Specialty:  none      Review of Systems:   Review of Systems   Constitutional: Negative.  Negative for fever.   Eyes: Negative for double vision.   Respiratory: Negative.  Negative for cough and shortness of breath.    Cardiovascular: Negative.  Negative for chest pain and palpitations.   Gastrointestinal: Negative.  Negative for abdominal pain, constipation, diarrhea, heartburn, nausea and vomiting.   Genitourinary: Negative.  Negative for dysuria and hematuria.   Musculoskeletal: Negative.  Negative for joint pain and myalgias.   Skin: Negative.  Negative for rash.   Neurological: Negative.  Negative for dizziness and weakness.   Endo/Heme/Allergies: Does not bruise/bleed easily.       Objective Data:   Physical Exam:   Vitals:   Temp:  [36.8 °C (98.2 °F)-37.4 °C (99.4 °F)] 36.8 °C (98.2 °F)  Pulse:  [] 68  Resp:  [14-18] 14  BP: (116-146)/(75-97) 119/75  SpO2:  [90 %-99 %] 92 %   Physical Exam  Vitals and nursing note reviewed.   Constitutional:       General: He is not in acute distress.     Appearance: Normal appearance. He is not ill-appearing, toxic-appearing or diaphoretic.   HENT:      Head: Normocephalic and atraumatic.      Mouth/Throat:      Mouth: Mucous membranes are moist.   Eyes:       General: Scleral icterus present.      Extraocular Movements: Extraocular movements intact.      Pupils: Pupils are equal, round, and reactive to light.   Cardiovascular:      Rate and Rhythm: Normal rate and regular rhythm.      Pulses: Normal pulses.      Heart sounds: Normal heart sounds. No murmur. No friction rub. No gallop.    Pulmonary:      Effort: Pulmonary effort is normal. No respiratory distress.      Breath sounds: Normal breath sounds. No stridor. No wheezing or rales.   Abdominal:      General: Bowel sounds are normal. There is no distension.      Palpations: Abdomen is soft.      Tenderness: There is no abdominal tenderness.   Musculoskeletal:      Right lower leg: No edema.      Left lower leg: No edema.   Skin:     General: Skin is warm.      Coloration: Skin is jaundiced. Skin is not pale.      Findings: No bruising, erythema, lesion or rash.   Neurological:      General: No focal deficit present.      Mental Status: He is alert and oriented to person, place, and time.   Psychiatric:         Mood and Affect: Mood normal.         Behavior: Behavior normal.           Labs:   Recent Labs     03/20/21 2233 03/20/21 2233 03/21/21 0358 03/21/21 1813 03/22/21  0051   WBC 4.4*   < > 3.6* 2.5* 4.3*   RBC 4.98   < > 3.88* 3.81* 4.12*   HEMOGLOBIN 17.3   < > 13.4* 13.3* 14.1   HEMATOCRIT 47.1   < > 36.9* 36.9* 39.4*   MCV 94.6   < > 95.1 96.9 95.6   MCH 34.7*   < > 34.5* 34.9* 34.2*   RDW 45.9   < > 46.6 48.2 46.3   PLATELETCT 51*   < > 31* 25* 22*   MPV 10.6   < > 9.9 9.1 11.5   NEUTSPOLYS 79.00*  --   --  49.00 70.40   LYMPHOCYTES 9.40*  --   --  36.70 19.10*   MONOCYTES 9.60  --   --  9.40 7.50   EOSINOPHILS 0.20  --   --  4.10 2.30   BASOPHILS 1.60  --   --  0.80 0.50    < > = values in this interval not displayed.     Recent Labs     03/21/21 0358 03/21/21 1813 03/22/21  0644   SODIUM 137 134* 127*   POTASSIUM 4.4 3.5* 4.0   CHLORIDE 97 96 92*   CO2 26 28 24   GLUCOSE 116* 100* 100*   BUN 9  5* 3*     Phos: 1.3  Ma.5  Total cholesterol: 148  Tri  HDL: 57  LDL: 78  Lactic acid: 1.4    Imaging:   DX-CHEST-LIMITED (1 VIEW)   Final Result         1.  No acute cardiopulmonary disease.      CT-ABDOMEN-PELVIS WITH   Final Result         1.  Mild cecal and ascending colonic wall thickening, appearance suggests focal segmental colitis.   2.  Nonvisualization of the appendix limits evaluation for and exclusion of appendicitis.   3.  Hepatomegaly and diffuse hepatic steatosis            Problem Representation:  31 year old male with a history of alcohol abuse, admitted for epigastric pain associated with nausea and vomiting and diagnosed with Alcohol induced pancreatitis, Alcohol Hepatitis complicated by thrombocytopenia.      * Alcohol-induced acute pancreatitis- (present on admission)  Assessment & Plan  2/3 critieria (Lipase 290, s/s c/w pancreatitis, CT findings not consistent). Amylate 111. Improved.  -advance diet  -IVF stopped  -pain and nausea resolved  -patient referred for ETOH rehab services  -PT/OT/         Thrombocytopenia (HCC)- (present on admission)  Assessment & Plan  DDX: alcohol abuse and alcoholic hepatitis w/ hypersplenism and bone marrow suppression, though no leukopenia or anemia appreciated. Admitted with 51, downtrending to 22 today. 4T's score <5%, low probability for HIT. No medications are list that could cause this. No rashes or signs of mucocutaneous bleeding, lower on the differential is ITP. Sepsis, DIC or infection not suspected.  -will go to lab and look at peripheral smear  -:HIV, Hepatitis panel (Hep C), H. Pylori.  -transfuse Plates if <15 or bleeding  -CTM      Alcoholic hepatitis without ascites- (present on admission)  Assessment & Plan  Downtrending, AST/ALT ratio >2. Maddrey's Discriminant Function : 8.2 = good prognosis, no indication for prednisone.  upon admission AST/ALT significantly elevated 433/192, ratio roughly 2:1, improved to  296/138.  -CT abdomen shows hepatomegaly With hepatic steatosis.  -CTM LFTs  -Follow with PCP upon discharge  -Will provide resources that may  Help the pt quit drinking      QT prolongation- (present on admission)  Assessment & Plan  Resolved.       Alcohol withdrawal (HCC)- (present on admission)  Assessment & Plan  Improved on CIWA Protocol, most recent 2. Requiring no Ativan the last 24 hours.  -telemetry  -MV, folic acid, Thiamine  -phos >2, Mg>2, K>4  -watch for refeeding syndrome  -aspiration, seizure, fall precautions    Alcohol abuse- (present on admission)  Assessment & Plan  Upon admission 3/20-blood alcohol level was 80  Counseling provided, also discussed starting on medical therapy for alcohol abuse.  Will discuss possible resources upon discharge     Metabolic acidosis, increased anion gap  Assessment & Plan  Resolved. lactic acid on admission 6.9, given 1 L of LR bolus in the ED followed by continuous IV fluid.  -Banana bag given upon admission  -Lactic acid downtrending 2.7 on 3/21/21  -No signs and symptoms of infectious process.  -Patient tolerating oral liquid.      Asthma, mild intermittent  Assessment & Plan  Stable  -RT protocol  -no PFT on record    Hypomagnesemia  Assessment & Plan  1.5  -Mg Sulfate 4g IV x 1  -CTM    Hypophosphatemia  Assessment & Plan  1.3  -kphos 15mmol  -replete PRN  -CTM    Lactic acidosis- (present on admission)  Assessment & Plan  Resolved.Likely 2/2 Liver failure in setting of alcohol abuse.

## 2021-03-22 NOTE — ASSESSMENT & PLAN NOTE
Upon admission 3/20-blood alcohol level was 80  Counseling provided, also discussed starting on medical therapy for alcohol abuse.  Will discuss possible resources upon discharge

## 2021-03-23 ENCOUNTER — PATIENT OUTREACH (OUTPATIENT)
Dept: HEALTH INFORMATION MANAGEMENT | Facility: OTHER | Age: 31
End: 2021-03-23

## 2021-03-23 VITALS
OXYGEN SATURATION: 96 % | DIASTOLIC BLOOD PRESSURE: 86 MMHG | RESPIRATION RATE: 18 BRPM | BODY MASS INDEX: 25.19 KG/M2 | HEIGHT: 66 IN | TEMPERATURE: 97.7 F | SYSTOLIC BLOOD PRESSURE: 118 MMHG | WEIGHT: 156.75 LBS | HEART RATE: 120 BPM

## 2021-03-23 LAB
ALBUMIN SERPL BCP-MCNC: 3.9 G/DL (ref 3.2–4.9)
ALBUMIN/GLOB SERPL: 1.3 G/DL
ALP SERPL-CCNC: 136 U/L (ref 30–99)
ALT SERPL-CCNC: 110 U/L (ref 2–50)
ANION GAP SERPL CALC-SCNC: 13 MMOL/L (ref 7–16)
AST SERPL-CCNC: 215 U/L (ref 12–45)
BASOPHILS # BLD AUTO: 0.7 % (ref 0–1.8)
BASOPHILS # BLD: 0.03 K/UL (ref 0–0.12)
BILIRUB SERPL-MCNC: 3.5 MG/DL (ref 0.1–1.5)
BUN SERPL-MCNC: 3 MG/DL (ref 8–22)
CALCIUM SERPL-MCNC: 9.7 MG/DL (ref 8.5–10.5)
CHLORIDE SERPL-SCNC: 99 MMOL/L (ref 96–112)
CO2 SERPL-SCNC: 21 MMOL/L (ref 20–33)
CREAT SERPL-MCNC: 0.62 MG/DL (ref 0.5–1.4)
EOSINOPHIL # BLD AUTO: 0.14 K/UL (ref 0–0.51)
EOSINOPHIL NFR BLD: 3.2 % (ref 0–6.9)
ERYTHROCYTE [DISTWIDTH] IN BLOOD BY AUTOMATED COUNT: 47.6 FL (ref 35.9–50)
GLOBULIN SER CALC-MCNC: 3.1 G/DL (ref 1.9–3.5)
GLUCOSE SERPL-MCNC: 142 MG/DL (ref 65–99)
HCT VFR BLD AUTO: 40.7 % (ref 42–52)
HGB BLD-MCNC: 14.9 G/DL (ref 14–18)
IMM GRANULOCYTES # BLD AUTO: 0.01 K/UL (ref 0–0.11)
IMM GRANULOCYTES NFR BLD AUTO: 0.2 % (ref 0–0.9)
LYMPHOCYTES # BLD AUTO: 1.05 K/UL (ref 1–4.8)
LYMPHOCYTES NFR BLD: 24.4 % (ref 22–41)
MAGNESIUM SERPL-MCNC: 1.8 MG/DL (ref 1.5–2.5)
MCH RBC QN AUTO: 35.2 PG (ref 27–33)
MCHC RBC AUTO-ENTMCNC: 36.6 G/DL (ref 33.7–35.3)
MCV RBC AUTO: 96.2 FL (ref 81.4–97.8)
MONOCYTES # BLD AUTO: 0.5 K/UL (ref 0–0.85)
MONOCYTES NFR BLD AUTO: 11.6 % (ref 0–13.4)
NEUTROPHILS # BLD AUTO: 2.58 K/UL (ref 1.82–7.42)
NEUTROPHILS NFR BLD: 59.9 % (ref 44–72)
NRBC # BLD AUTO: 0 K/UL
NRBC BLD-RTO: 0 /100 WBC
PHOSPHATE SERPL-MCNC: 1.6 MG/DL (ref 2.5–4.5)
PLATELET # BLD AUTO: 27 K/UL (ref 164–446)
PMV BLD AUTO: 11.1 FL (ref 9–12.9)
POTASSIUM SERPL-SCNC: 4 MMOL/L (ref 3.6–5.5)
PROT SERPL-MCNC: 7 G/DL (ref 6–8.2)
RBC # BLD AUTO: 4.23 M/UL (ref 4.7–6.1)
SODIUM SERPL-SCNC: 133 MMOL/L (ref 135–145)
TSH SERPL DL<=0.005 MIU/L-ACNC: 2.63 UIU/ML (ref 0.38–5.33)
VIT B12 SERPL-MCNC: 978 PG/ML (ref 211–911)
WBC # BLD AUTO: 4.3 K/UL (ref 4.8–10.8)

## 2021-03-23 PROCEDURE — A9270 NON-COVERED ITEM OR SERVICE: HCPCS | Performed by: GENERAL PRACTICE

## 2021-03-23 PROCEDURE — 94760 N-INVAS EAR/PLS OXIMETRY 1: CPT

## 2021-03-23 PROCEDURE — 700102 HCHG RX REV CODE 250 W/ 637 OVERRIDE(OP): Performed by: STUDENT IN AN ORGANIZED HEALTH CARE EDUCATION/TRAINING PROGRAM

## 2021-03-23 PROCEDURE — 99238 HOSP IP/OBS DSCHRG MGMT 30/<: CPT | Performed by: HOSPITALIST

## 2021-03-23 PROCEDURE — 86038 ANTINUCLEAR ANTIBODIES: CPT

## 2021-03-23 PROCEDURE — 700105 HCHG RX REV CODE 258: Performed by: GENERAL PRACTICE

## 2021-03-23 PROCEDURE — 700101 HCHG RX REV CODE 250: Performed by: GENERAL PRACTICE

## 2021-03-23 PROCEDURE — 83735 ASSAY OF MAGNESIUM: CPT

## 2021-03-23 PROCEDURE — A9270 NON-COVERED ITEM OR SERVICE: HCPCS | Performed by: HOSPITALIST

## 2021-03-23 PROCEDURE — 36415 COLL VENOUS BLD VENIPUNCTURE: CPT

## 2021-03-23 PROCEDURE — 85025 COMPLETE CBC W/AUTO DIFF WBC: CPT

## 2021-03-23 PROCEDURE — 700102 HCHG RX REV CODE 250 W/ 637 OVERRIDE(OP): Performed by: HOSPITALIST

## 2021-03-23 PROCEDURE — 84100 ASSAY OF PHOSPHORUS: CPT

## 2021-03-23 PROCEDURE — 80053 COMPREHEN METABOLIC PANEL: CPT

## 2021-03-23 PROCEDURE — 94640 AIRWAY INHALATION TREATMENT: CPT

## 2021-03-23 PROCEDURE — 700102 HCHG RX REV CODE 250 W/ 637 OVERRIDE(OP): Performed by: GENERAL PRACTICE

## 2021-03-23 PROCEDURE — A9270 NON-COVERED ITEM OR SERVICE: HCPCS | Performed by: STUDENT IN AN ORGANIZED HEALTH CARE EDUCATION/TRAINING PROGRAM

## 2021-03-23 RX ADMIN — SODIUM PHOSPHATE, MONOBASIC, MONOHYDRATE AND SODIUM PHOSPHATE, DIBASIC, ANHYDROUS 15 MMOL: 276; 142 INJECTION, SOLUTION INTRAVENOUS at 08:08

## 2021-03-23 RX ADMIN — DOCUSATE SODIUM 50 MG AND SENNOSIDES 8.6 MG 2 TABLET: 8.6; 5 TABLET, FILM COATED ORAL at 05:05

## 2021-03-23 RX ADMIN — LORAZEPAM 1 MG: 1 TABLET ORAL at 10:24

## 2021-03-23 RX ADMIN — IPRATROPIUM BROMIDE 0.5 MG: 0.5 SOLUTION RESPIRATORY (INHALATION) at 11:11

## 2021-03-23 RX ADMIN — Medication 400 MG: at 08:03

## 2021-03-23 RX ADMIN — Medication 400 MG: at 09:44

## 2021-03-23 RX ADMIN — FOLIC ACID 1 MG: 1 TABLET ORAL at 05:05

## 2021-03-23 RX ADMIN — LEVALBUTEROL 1.25 MG: 1.25 SOLUTION RESPIRATORY (INHALATION) at 11:12

## 2021-03-23 RX ADMIN — THERA TABS 1 TABLET: TAB at 05:05

## 2021-03-23 RX ADMIN — Medication 100 MG: at 05:05

## 2021-03-23 ASSESSMENT — LIFESTYLE VARIABLES
ANXIETY: MODERATELY ANXIOUS OR GUARDED, SO ANXIETY IS INFERRED
TREMOR: NO TREMOR
TREMOR: *
PAROXYSMAL SWEATS: NO SWEAT VISIBLE
AGITATION: SOMEWHAT MORE THAN NORMAL ACTIVITY
NAUSEA AND VOMITING: NO NAUSEA AND NO VOMITING
PAROXYSMAL SWEATS: NO SWEAT VISIBLE
HEADACHE, FULLNESS IN HEAD: NOT PRESENT
NAUSEA AND VOMITING: NO NAUSEA AND NO VOMITING
TOTAL SCORE: 3
AUDITORY DISTURBANCES: NOT PRESENT
HEADACHE, FULLNESS IN HEAD: NOT PRESENT
AGITATION: SOMEWHAT MORE THAN NORMAL ACTIVITY
TOTAL SCORE: 8
VISUAL DISTURBANCES: VERY MILD SENSITIVITY
ANXIETY: *
ORIENTATION AND CLOUDING OF SENSORIUM: ORIENTED AND CAN DO SERIAL ADDITIONS
ORIENTATION AND CLOUDING OF SENSORIUM: ORIENTED AND CAN DO SERIAL ADDITIONS
AUDITORY DISTURBANCES: NOT PRESENT
VISUAL DISTURBANCES: NOT PRESENT

## 2021-03-23 ASSESSMENT — PAIN DESCRIPTION - PAIN TYPE: TYPE: ACUTE PAIN

## 2021-03-23 NOTE — DISCHARGE SUMMARY
Discharge Summary    Date of Admission: 3/20/2021  Date of Discharge: No discharge date for patient encounter.  Discharging Attending: Jyoti Nava M.D.   Discharging Senior Resident: Dr. Man  Discharging Intern: Dr. Yadav    CHIEF COMPLAINT ON ADMISSION  Chief Complaint   Patient presents with   • N/V     x 5-6 days. diffuse abd pain.        Reason for Admission: abdominal pain, nausea, vomiting    Admission Date  3/20/2021    CODE STATUS  Full Code    HPI & HOSPITAL COURSE  31 year old male with a history of alcohol abuse, admitted for epigastric pain associated with nausea and vomiting and diagnosed with Alcohol induced pancreatitis, Alcohol Hepatitis complicated by thrombocytopenia.        #Alcohol-induced acute pancreatitis  2/3 critieria (Lipase 290, s/s c/w pancreatitis, CT findings not consistent). Amylate 111. Improved.  -tolerated regular diet and asymptomatic upon discharge  -patient referred for ETOH rehab services  -discharge home with close outpatient f/u with PCP      #Alcohol hepatitis without ascites  #Alcohol Abuse: Upon admission 3/20-blood alcohol level was 80  #Metoboli acidosis-high anion gap-resolved  #Hypophophatemia-treated prior to discharge  #Hypomagnesemia-resolved  #QT prolongation: resolved  Downtrending, AST/ALT ratio >2. Maddrey's Discriminant Function : 8.2 = good prognosis, no indication for prednisone.  upon admission AST/ALT significantly elevated 433/192, ratio roughly 2:1, improved to 296/138.  -CT abdomen shows hepatomegaly With hepatic steatosis.    #Alcohol Withdrawal  Improved on CIWA Protocol-safe for discharge       #Thrombocytopenia  Uptrending from 22->27. Admission 51. D  -pending peripheral smear  -:HIV, Hepatitis panel (Hep C), H. Pylori, TSH, B1/folate: negative,  -pending SOPHIA  -patient to follow up with PCP outpatient in one week to repeat CBC and review SOPHIA, peripheral smear        #Asthma-mild intermittent  Stable  -Albuterol PRN  -no PFT on  record                             Therefore, he is discharged in good and stable condition to home with close outpatient follow-up.    The patient met 2-midnight criteria for an inpatient stay at the time of discharge.    PHYSICAL EXAM ON DISCHARGE  Temp:  [36.3 °C (97.4 °F)-37.4 °C (99.3 °F)] 36.9 °C (98.5 °F)  Pulse:  [] 98  Resp:  [16-18] 18  BP: (107-138)/(68-96) 138/96  SpO2:  [94 %-97 %] 96 %    Physical Exam  Vitals and nursing note reviewed.   Constitutional:       General: He is not in acute distress.     Appearance: Normal appearance. He is not ill-appearing, toxic-appearing or diaphoretic.   HENT:      Head: Normocephalic and atraumatic.      Mouth/Throat:      Mouth: Mucous membranes are moist.   Eyes:      General: Scleral icterus present.      Extraocular Movements: Extraocular movements intact.      Pupils: Pupils are equal, round, and reactive to light.   Cardiovascular:      Rate and Rhythm: Normal rate and regular rhythm.      Pulses: Normal pulses.      Heart sounds: Normal heart sounds. No murmur. No friction rub. No gallop.    Pulmonary:      Effort: Pulmonary effort is normal. No respiratory distress.      Breath sounds: Normal breath sounds. No stridor. No wheezing or rales.   Abdominal:      General: Bowel sounds are normal. There is no distension.      Palpations: Abdomen is soft.      Tenderness: There is no abdominal tenderness.   Musculoskeletal:      Right lower leg: No edema.      Left lower leg: No edema.   Skin:     General: Skin is warm.      Coloration: Skin is jaundiced. Skin is not pale.      Findings: No bruising, erythema, lesion or rash.   Neurological:      General: No focal deficit present.      Mental Status: He is alert and oriented to person, place, and time.   Psychiatric:         Mood and Affect: Mood normal.         Behavior: Behavior normal.   Discharge Date  3/23/21    FOLLOW UP ITEMS POST DISCHARGE  -stop drinking alcohol which will prevent your liver from  "worsening  -resources to schedule an appointment with a primary care provider in one week to repeat CBC to evaluate platelets and make sure they are continuing to increase from baseline of low 22, now 27 on discharge.  -patient to follow up with PCP outpatient in one week to review SOPHIA, peripheral smear    DISCHARGE DIAGNOSES  Principal Problem:    Alcohol-induced acute pancreatitis POA: Yes  Active Problems:    Alcoholic hepatitis without ascites POA: Yes    Thrombocytopenia (HCC) POA: Yes    Alcohol abuse POA: Yes    Alcohol withdrawal (HCC) POA: Yes    QT prolongation POA: Yes    Lactic acidosis POA: Yes    Hypophosphatemia POA: Unknown    Hypomagnesemia POA: Unknown    Asthma, mild intermittent POA: Unknown    Metabolic acidosis, increased anion gap POA: Unknown  Resolved Problems:    * No resolved hospital problems. *      FOLLOW UP  No future appointments.  [address] [address] [phone]  Call  Please call [facility] to establish with a Primary Care Provider. This office has sliding fee scales based on income. Thank you.      MEDICATIONS ON DISCHARGE     Medication List      CONTINUE taking these medications      Instructions   albuterol 108 (90 Base) MCG/ACT Aers inhalation aerosol   Inhale 2 Puffs by mouth every 6 hours as needed for Shortness of Breath (cough).  Dose: 2 Puff            Allergies  Allergies   Allergen Reactions   • Morphine Unspecified     Pt sts allergic rxn to morphine is hallucinations.    • Other Drug Swelling     GI Cocktail- \"throat swells\"   • Penicillins Anaphylaxis       DIET  Orders Placed This Encounter   Procedures   • Diet Order Diet: Regular (regular)     Standing Status:   Standing     Number of Occurrences:   1     Order Specific Question:   Diet:     Answer:   Regular [1]     Comments:   regular   • Discontinue Diet Tray     Standing Status:   Standing     Number of Occurrences:   1       ACTIVITY  As " tolerated.  Weight bearing as tolerated    CON SULTATIONS  none    PROCEDURES  None    Total time spent on discharge summary 35 minutes.

## 2021-03-23 NOTE — CARE PLAN
Problem: Safety  Goal: Will remain free from injury  Outcome: PROGRESSING AS EXPECTED  Note: Personal items and call light within reach, instructed to call for assistance, bed alarm activated, clutter removed, adequate lighting achieved, pt a&ox4 w/ periods of impulsiveness and has failed to call RN for assistance to the BR, RN close to pt room in response to pt failure to notify when getting out of bed, will continue to monitor and assess as the night continues.  Goal: Will remain free from falls  Outcome: PROGRESSING AS EXPECTED  Note: Personal items and call light within reach, instructed to call for assistance, bed alarm activated, clutter removed, adequate lighting achieved, pt a&ox4 w/ periods of impulsiveness and has failed to call RN for assistance to the BR, RN close to pt room in response to pt failure to notify when getting out of bed, will continue to monitor and assess as the night continues.

## 2021-03-23 NOTE — PROGRESS NOTES
Daily Progress Note:     Date of Service: 3/23/2021  Primary Team: [facility]   Attending: Jyoti Nava M.D.   Senior Resident: Dr. Man  Intern: Dr. Yadav  Contact:  [phone]    ID:31 year old male with a history of alcohol abuse, admitted for epigastric pain associated with nausea and vomiting and diagnosed with Alcohol induced pancreatitis, Alcohol Hepatitis        Chief Complaint: abdominal pain, nausea with vomiting      Subjective: Overnight, the patient reports he has mild lower abdominal pain, otherwise no other pain. Denies nausea, vomiting. PO tolerating, having BM, voiding and ambulating without issue. Telemetry SR-ST , 170 when out of bed which is improved from 200's a couple of days ago.      Interval Update: platelets uptrending. diacharge home with close outpatient PCP follow up      Consultants/Specialty:  none      Review of Systems:   Constitutional: Negative.  Negative for fever.   Eyes: Negative for double vision.   Respiratory: Negative.  Negative for cough and shortness of breath.    Cardiovascular: Negative.  Negative for chest pain and palpitations.   Gastrointestinal: Negative.  Negative for abdominal pain, constipation, diarrhea, heartburn, nausea and vomiting.   Genitourinary: Negative.  Negative for dysuria and hematuria.   Musculoskeletal: Negative.  Negative for joint pain and myalgias.   Skin: Negative.  Negative for rash.   Neurological: Negative.  Negative for dizziness and weakness.   Endo/Heme/Allergies: Does not bruise/bleed easily.     Objective Data:   Physical Exam:   Vitals:   Temp:  [36.3 °C (97.4 °F)-37.4 °C (99.3 °F)] 36.9 °C (98.5 °F)  Pulse:  [] 98  Resp:  [16-18] 18  BP: (107-138)/(68-96) 138/96  SpO2:  [94 %-97 %] 96 %   Physical Exam  Vitals and nursing note reviewed.   Constitutional:       General: He is not in acute distress.     Appearance: Normal appearance. He is not ill-appearing, toxic-appearing or diaphoretic.   HENT:       Head: Normocephalic and atraumatic.      Mouth/Throat:      Mouth: Mucous membranes are moist.   Eyes:      General: Scleral icterus present.      Extraocular Movements: Extraocular movements intact.      Pupils: Pupils are equal, round, and reactive to light.   Cardiovascular:      Rate and Rhythm: Normal rate and regular rhythm.      Pulses: Normal pulses.      Heart sounds: Normal heart sounds. No murmur. No friction rub. No gallop.    Pulmonary:      Effort: Pulmonary effort is normal. No respiratory distress.      Breath sounds: Normal breath sounds. No stridor. No wheezing or rales.   Abdominal:      General: Bowel sounds are normal. There is no distension.      Palpations: Abdomen is soft.      Tenderness: There is no abdominal tenderness.   Musculoskeletal:      Right lower leg: No edema.      Left lower leg: No edema.   Skin:     General: Skin is warm.      Coloration: Skin is jaundiced. Skin is not pale.      Findings: No bruising, erythema, lesion or rash.   Neurological:      General: No focal deficit present.      Mental Status: He is alert and oriented to person, place, and time.   Psychiatric:         Mood and Affect: Mood normal.         Behavior: Behavior normal.           Labs:   Recent Labs     03/21/21 1813 03/22/21  0051 03/23/21  0016   WBC 2.5* 4.3* 4.3*   RBC 3.81* 4.12* 4.23*   HEMOGLOBIN 13.3* 14.1 14.9   HEMATOCRIT 36.9* 39.4* 40.7*   MCV 96.9 95.6 96.2   MCH 34.9* 34.2* 35.2*   RDW 48.2 46.3 47.6   PLATELETCT 25* 22* 27*   MPV 9.1 11.5 11.1   NEUTSPOLYS 49.00 70.40 59.90   LYMPHOCYTES 36.70 19.10* 24.40   MONOCYTES 9.40 7.50 11.60   EOSINOPHILS 4.10 2.30 3.20   BASOPHILS 0.80 0.50 0.70     Recent Labs     03/21/21 1813 03/22/21  0644 03/23/21  0016   SODIUM 134* 127* 133*   POTASSIUM 3.5* 4.0 4.0   CHLORIDE 96 92* 99   CO2 28 24 21   GLUCOSE 100* 100* 142*   BUN 5* 3* 3*         Imaging:   DX-CHEST-LIMITED (1 VIEW)   Final Result         1.  No acute cardiopulmonary disease.       CT-ABDOMEN-PELVIS WITH   Final Result         1.  Mild cecal and ascending colonic wall thickening, appearance suggests focal segmental colitis.   2.  Nonvisualization of the appendix limits evaluation for and exclusion of appendicitis.   3.  Hepatomegaly and diffuse hepatic steatosis            Problem Representation: 31 year old male with a history of alcohol abuse, admitted for epigastric pain associated with nausea and vomiting and diagnosed with Alcohol induced pancreatitis, Alcohol Hepatitis complicated by thrombocytopenia.      * Alcohol-induced acute pancreatitis- (present on admission)  Assessment & Plan  2/3 critieria (Lipase 290, s/s c/w pancreatitis, CT findings not consistent). Amylate 111. Improved.  -advance diet  -IVF stopped  -pain and nausea resolved  -patient referred for ETOH rehab services  -discharge home with close outpatient f/u with PCP        Thrombocytopenia (HCC)- (present on admission)  Assessment & Plan  Uptrending from 22->27. Admission 51. DDX: alcohol abuse and alcoholic hepatitis w/ hypersplenism and bone marrow suppression, though no leukopenia or anemia appreciated.. 4T's score <5%, low probability for HIT. No medications are list that could cause this. No rashes or signs of mucocutaneous bleeding, lower on the differential is ITP. Sepsis, DIC or infection not suspected.  -pending peripheral smear  -:HIV, Hepatitis panel (Hep C), H. Pylori, TSH, B1/folate: negative,  -pending SOPHIA  -transfuse Plates if <15 or bleeding  -CTM      Alcoholic hepatitis without ascites- (present on admission)  Assessment & Plan  Downtrending, AST/ALT ratio >2. Maddrey's Discriminant Function : 8.2 = good prognosis, no indication for prednisone.  upon admission AST/ALT significantly elevated 433/192, ratio roughly 2:1, improved to 296/138.  -CT abdomen shows hepatomegaly With hepatic steatosis.  -Follow with PCP upon discharge  -Will provide resources that may  Help the pt quit drinking      QT  prolongation- (present on admission)  Assessment & Plan  Resolved.       Alcohol withdrawal (HCC)- (present on admission)  Assessment & Plan  Improved on CIWA Protocol, most recent 3. Requiring 2mg Ativan the last 24 hours.  -telemetry  -MV, folic acid, Thiamine  -phos >2, Mg>2, K>4  -watch for refeeding syndrome  -aspiration, seizure, fall precautions    Alcohol abuse- (present on admission)  Assessment & Plan  Upon admission 3/20-blood alcohol level was 80  Counseling provided, also discussed starting on medical therapy for alcohol abuse.  Will discuss possible resources upon discharge     Metabolic acidosis, increased anion gap  Assessment & Plan  Resolved    Asthma, mild intermittent  Assessment & Plan  Stable  -RT protocol  -no PFT on record    Hypomagnesemia  Assessment & Plan  resolved  -CTM    Hypophosphatemia  Assessment & Plan  1.6  -Naphos 15mmol  -replete PRN  -CTM    Lactic acidosis- (present on admission)  Assessment & Plan  Resolved.Likely 2/2 Liver failure in setting of alcohol abuse.

## 2021-03-23 NOTE — DISCHARGE INSTRUCTIONS
Discharge Instructions    Discharged to home by car with friend. Discharged via walking, hospital escort: Yes.  Special equipment needed: Not Applicable    Be sure to schedule a follow-up appointment with your primary care doctor or any specialists as instructed.     Discharge Plan:   Diet Plan: Discussed  Activity Level: Discussed  Confirmed Follow up Appointment: Patient to Call and Schedule Appointment  Confirmed Symptoms Management: Discussed  Medication Reconciliation Updated: Yes  Influenza Vaccine Indication: Patient Refuses    I understand that a diet low in cholesterol, fat, and sodium is recommended for good health. Unless I have been given specific instructions below for another diet, I accept this instruction as my diet prescription.   Other diet: regular    Special Instructions: None    · Is patient discharged on Warfarin / Coumadin?   No     Depression / Suicide Risk    As you are discharged from this [facility] facility, it is important to learn how to keep safe from harming yourself.    Recognize the warning signs:  · Abrupt changes in personality, positive or negative- including increase in energy   · Giving away possessions  · Change in eating patterns- significant weight changes-  positive or negative  · Change in sleeping patterns- unable to sleep or sleeping all the time   · Unwillingness or inability to communicate  · Depression  · Unusual sadness, discouragement and loneliness  · Talk of wanting to die  · Neglect of personal appearance   · Rebelliousness- reckless behavior  · Withdrawal from people/activities they love  · Confusion- inability to concentrate     If you or a loved one observes any of these behaviors or has concerns about self-harm, here's what you can do:  · Talk about it- your feelings and reasons for harming yourself  · Remove any means that you might use to hurt yourself (examples: pills, rope, extension cords, firearm)  · Get professional help from the community (Mental  Health, Substance Abuse, psychological counseling)  · Do not be alone:Call your Safe Contact- someone whom you trust who will be there for you.  · Call your local CRISIS HOTLINE 086-7851 or 733-321-7030  · Call your local Children's Mobile Crisis Response Team [place] (086) 187-9563 or www.[employer]  · Call the toll free National Suicide Prevention Hotlines   · National Suicide Prevention Lifeline 529-346-ZYXY (3171)  · National Hope Line Network 800-SUICIDE (334-8373)    -stop drinking alcohol which will prevent your liver from worsening  -resources to schedule an appointment with a primary care provider in one week to repeat CBC to evaluate platelets and make sure they are continuing to increase from baseline of low 22, now 27 on discharge.  -patient to follow up with PCP outpatient in one week to review SOPHIA, peripheral smear        Alcohol Use Disorder  Alcohol use disorder is when your drinking disrupts your daily life. When you have this condition, you drink too much alcohol and you cannot control your drinking.  Alcohol use disorder can cause serious problems with your physical health. It can affect your brain, heart, liver, pancreas, immune system, stomach, and intestines. Alcohol use disorder can increase your risk for certain cancers and cause problems with your mental health, such as depression, anxiety, psychosis, delirium, and dementia. People with this disorder risk hurting themselves and others.  What are the causes?  This condition is caused by drinking too much alcohol over time. It is not caused by drinking too much alcohol only one or two times. Some people with this condition drink alcohol to cope with or escape from negative life events. Others drink to relieve pain or symptoms of mental illness.  What increases the risk?  You are more likely to develop this condition if:  · You have a family history of alcohol use disorder.  · Your culture encourages drinking to the point of  intoxication, or makes alcohol easy to get.  · You had a mood or conduct disorder in childhood.  · You have been a victim of abuse.  · You are an adolescent and:  ? You have poor grades or difficulties in school.  ? Your caregivers do not talk to you about saying no to alcohol, or supervise your activities.  ? You are impulsive or you have trouble with self-control.  What are the signs or symptoms?  Symptoms of this condition include:  · Drinking more than you want to.  · Drinking for longer than you want to.  · Trying several times to drink less or to control your drinking.  · Spending a lot of time getting alcohol, drinking, or recovering from drinking.  · Craving alcohol.  · Having problems at work, at school, or at home due to drinking.  · Having problems in relationships due to drinking.  · Drinking when it is dangerous to drink, such as before driving a car.  · Continuing to drink even though you know you might have a physical or mental problem related to drinking.  · Needing more and more alcohol to get the same effect you want from the alcohol (building up tolerance).  · Having symptoms of withdrawal when you stop drinking. Symptoms of withdrawal include:  ? Fatigue.  ? Nightmares.  ? Trouble sleeping.  ? Depression.  ? Anxiety.  ? Fever.  ? Seizures.  ? Severe confusion.  ? Feeling or seeing things that are not there (hallucinations).  ? Tremors.  ? Rapid heart rate.  ? Rapid breathing.  ? High blood pressure.  · Drinking to avoid symptoms of withdrawal.  How is this diagnosed?  This condition is diagnosed with an assessment. Your health care provider may start the assessment by asking three or four questions about your drinking.  Your health care provider may perform a physical exam or do lab tests to see if you have physical problems resulting from alcohol use. She or he may refer you to a mental health professional for evaluation.  How is this treated?  Some people with alcohol use disorder are able to  reduce their alcohol use to low-risk levels. Others need to completely quit drinking alcohol. When necessary, mental health professionals with specialized training in substance use treatment can help. Your health care provider can help you decide how severe your alcohol use disorder is and what type of treatment you need. The following forms of treatment are available:  · Detoxification. Detoxification involves quitting drinking and using prescription medicines within the first week to help lessen withdrawal symptoms. This treatment is important for people who have had withdrawal symptoms before and for heavy drinkers who are likely to have withdrawal symptoms. Alcohol withdrawal can be dangerous, and in severe cases, it can cause death. Detoxification may be provided in a home, community, or primary care setting, or in a hospital or substance use treatment facility.  · Counseling. This treatment is also called talk therapy. It is provided by substance use treatment counselors. A counselor can address the reasons you use alcohol and suggest ways to keep you from drinking again or to prevent problem drinking. The goals of talk therapy are to:  ? Find healthy activities and ways for you to cope with stress.  ? Identify and avoid the things that trigger your alcohol use.  ? Help you learn how to handle cravings.  · Medicines. Medicines can help treat alcohol use disorder by:  ? Decreasing alcohol cravings.  ? Decreasing the positive feeling you have when you drink alcohol.  ? Causing an uncomfortable physical reaction when you drink alcohol (aversion therapy).  · Support groups. Support groups are led by people who have quit drinking. They provide emotional support, advice, and guidance.  These forms of treatment are often combined. Some people with this condition benefit from a combination of treatments provided by specialized substance use treatment centers.  Follow these instructions at home:  · Take  over-the-counter and prescription medicines only as told by your health care provider.  · Check with your health care provider before starting any new medicines.  · Ask friends and family members not to offer you alcohol.  · Avoid situations where alcohol is served, including gatherings where others are drinking alcohol.  · Create a plan for what to do when you are tempted to use alcohol.  · Find hobbies or activities that you enjoy that do not include alcohol.  · Keep all follow-up visits as told by your health care provider. This is important.  How is this prevented?  · If you drink, limit alcohol intake to no more than 1 drink a day for nonpregnant women and 2 drinks a day for men. One drink equals 12 oz of beer, 5 oz of wine, or 1½ oz of hard liquor.  · If you have a mental health condition, get treatment and support.  · Do not give alcohol to adolescents.  · If you are an adolescent:  ? Do not drink alcohol.  ? Do not be afraid to say no if someone offers you alcohol. Speak up about why you do not want to drink. You can be a positive role model for your friends and set a good example for those around you by not drinking alcohol.  ? If your friends drink, spend time with others who do not drink alcohol. Make new friends who do not use alcohol.  ? Find healthy ways to manage stress and emotions, such as meditation or deep breathing, exercise, spending time in nature, listening to music, or talking with a trusted friend or family member.  Contact a health care provider if:  · You are not able to take your medicines as told.  · Your symptoms get worse.  · You return to drinking alcohol (relapse) and your symptoms get worse.  Get help right away if:  · You have thoughts about hurting yourself or others.  If you ever feel like you may hurt yourself or others, or have thoughts about taking your own life, get help right away. You can go to your nearest emergency department or call:  · Your local emergency services (832  in the [place]  · A suicide crisis helpline, such as the National Suicide Prevention Lifeline at 1-617.167.1781. This is open 24 hours a day.  Summary  · Alcohol use disorder is when your drinking disrupts your daily life. When you have this condition, you drink too much alcohol and you cannot control your drinking.  · Treatment may include detoxification, counseling, medicine, and support groups.  · Ask friends and family members not to offer you alcohol. Avoid situations where alcohol is served.  · Get help right away if you have thoughts about hurting yourself or others.  This information is not intended to replace advice given to you by your health care provider. Make sure you discuss any questions you have with your health care provider.  Document Released: 01/25/2006 Document Revised: 11/30/2018 Document Reviewed: 09/14/2017  Elsevier Patient Education © 2020 [employer]        Acute Pancreatitis    The pancreas is a gland that is located behind the stomach on the left side of the abdomen. It produces enzymes that help to digest food. The pancreas also releases the hormones glucagon and insulin, which help to regulate blood sugar. Acute pancreatitis happens when inflammation of the pancreas suddenly occurs and the pancreas becomes irritated and swollen.  Most acute attacks last a few days and cause serious problems. Some people become dehydrated and develop low blood pressure. In severe cases, bleeding in the abdomen can lead to shock and can be life-threatening. The lungs, heart, and kidneys may fail.  What are the causes?  This condition may be caused by:  · Alcohol abuse.  · Drug abuse.  · Gallstones or other conditions that can block the tube that drains the pancreas (pancreatic duct).  · A tumor in the pancreas.  Other causes include:  · Certain medicines.  · Exposure to certain chemicals.  · Diabetes.  · An infection in the pancreas.  · Damage caused by an accident (trauma).  · The poison (venom) from a  scorpion bite.  · Abdominal surgery.  · Autoimmune pancreatitis. This is when the body's disease-fighting (immune) system attacks the pancreas.  · Genes that are passed from parent to child (inherited).  In some cases, the cause of this condition is not known.  What are the signs or symptoms?  Symptoms of this condition include:  · Pain in the upper abdomen that may radiate to the back. Pain may be severe.  · Tenderness and swelling of the abdomen.  · Nausea and vomiting.  · Fever.  How is this diagnosed?  This condition may be diagnosed based on:  · A physical exam.  · Blood tests.  · Imaging tests, such as X-rays, CT or MRI scans, or an ultrasound of the abdomen.  How is this treated?  Treatment for this condition usually requires a stay in the hospital. Treatment for this condition may include:  · Pain medicine.  · Fluid replacement through an IV.  · Placing a tube in the stomach to remove stomach contents and to control vomiting (NG tube, or nasogastric tube).  · Not eating for 3-4 days. This gives the pancreas a rest, because enzymes are not being produced that can cause further damage.  · Antibiotic medicines, if your condition is caused by an infection.  · Treating any underlying conditions that may be the cause.  · Steroid medicines, if your condition is caused by your immune system attacking your body's own tissues (autoimmune disease).  · Surgery on the pancreas or gallbladder.  Follow these instructions at home:  Eating and drinking    · Follow instructions from your health care provider about diet. This may involve avoiding alcohol and decreasing the amount of fat in your diet.  · Eat smaller, more frequent meals. This reduces the amount of digestive fluids that the pancreas produces.  · Drink enough fluid to keep your urine pale yellow.  · Do not drink alcohol if it caused your condition.  General instructions  · Take over-the-counter and prescription medicines only as told by your health care  provider.  · Do not drive or use heavy machinery while taking prescription pain medicine.  · Ask your health care provider if the medicine prescribed to you can cause constipation. You may need to take steps to prevent or treat constipation, such as:  ? Take an over-the-counter or prescription medicine for constipation.  ? Eat foods that are high in fiber such as whole grains and beans.  ? Limit foods that are high in fat and processed sugars, such as fried or sweet foods.  · Do not use any products that contain nicotine or tobacco, such as cigarettes, e-cigarettes, and chewing tobacco. If you need help quitting, ask your health care provider.  · Get plenty of rest.  · If directed, check your blood sugar at home as told by your health care provider.  · Keep all follow-up visits as told by your health care provider. This is important.  Contact a health care provider if you:  · Do not recover as quickly as expected.  · Develop new or worsening symptoms.  · Have persistent pain, weakness, or nausea.  · Recover and then have another episode of pain.  · Have a fever.  Get help right away if:  · You cannot eat or keep fluids down.  · Your pain becomes severe.  · Your skin or the white part of your eyes turns yellow (jaundice).  · You have sudden swelling in your abdomen.  · You vomit.  · You feel dizzy or you faint.  · Your blood sugar is high (over 300 mg/dL).  Summary  · Acute pancreatitis happens when inflammation of the pancreas suddenly occurs and the pancreas becomes irritated and swollen.  · This condition is typically caused by alcohol abuse, drug abuse, or gallstones.  · Treatment for this condition usually requires a stay in the hospital.  This information is not intended to replace advice given to you by your health care provider. Make sure you discuss any questions you have with your health care provider.  Document Released: 12/18/2006 Document Revised: 10/07/2019 Document Reviewed: 06/24/2019  [employer]  Patient Education © 2020 [employer]      Thrombocytopenia  Thrombocytopenia means that you have a low number of platelets in your blood. Platelets are tiny cells in the blood. When you bleed, they clump together at the cut or injury to stop the bleeding. This is called blood clotting. If you do not have enough platelets, it can cause bleeding problems. Some cases of this condition are mild while others are more severe.  What are the causes?  This condition may be caused by:  · Your body not making enough platelets. This may be caused by:  ? Your bone marrow not making blood cells (aplastic anemia).  ? Cancer in the bone marrow.  ? Certain medicines.  ? Infection in the bone marrow.  ? Drinking a lot of alcohol.  · Your body destroying platelets too quickly. This may be caused by:  ? Certain immune diseases.  ? Certain medicines.  ? Certain blood clotting disorders.  ? Certain disorders that are passed from parent to child (inherited).  ? Certain bleeding disorders.  ? Pregnancy.  ? Having a spleen that is larger than normal.  What are the signs or symptoms?  · Bleeding that is not normal.  · Nosebleeds.  · Heavy menstrual periods.  · Blood in the pee (urine) or poop (stool).  · A purple-like color to the skin (purpura).  · Bruising.  · A rash that looks like pinpoint, purple-red spots (petechiae).  How is this treated?  · Treatment of another condition that is causing the low platelet count.  · Medicines to help protect your platelets from being destroyed.  · A replacement (transfusion) of platelets to stop or prevent bleeding.  · Surgery to remove the spleen.  Follow these instructions at home:  Activity  · Avoid activities that could cause you to get hurt or bruised. Follow instructions about how to prevent falls.  · Take care not to cut yourself:  ? When you shave.  ? When you use scissors, needles, knives, or other tools.  · Take care not to burn yourself:  ? When you use an iron.  ? When you cook.  General  instructions    · Check your skin and the inside of your mouth for bruises or blood as told by your doctor.  · Check to see if there is blood in your spit (sputum), pee, and poop. Do this as told by your doctor.  · Do not drink alcohol.  · Take over-the-counter and prescription medicines only as told by your doctor.  · Do not take any medicines that have aspirin or NSAIDs in them. These medicines can thin your blood and cause you to bleed.  · Tell all of your doctors that you have this condition. Be sure to tell your dentist and eye doctor too.  Contact a doctor if:  · You have bruises and you do not know why.  Get help right away if:  · You are bleeding anywhere on your body.  · You have blood in your spit, pee, or poop.  Summary  · Thrombocytopenia means that you have a low number of platelets in your blood.  · Platelets are needed for blood clotting.  · Symptoms of this condition include bleeding that is not normal, and bruising.  · Take care not to cut or burn yourself.  This information is not intended to replace advice given to you by your health care provider. Make sure you discuss any questions you have with your health care provider.  Document Released: 12/06/2012 Document Revised: 09/19/2019 Document Reviewed: 09/19/2019  Elsevier Patient Education © 2020 [employer].

## 2021-03-23 NOTE — PROGRESS NOTES
Assumed Care  Bedside report received w/ pt in attendance and in stable condition w/ no s/s of worsening condition. Pt A&Ox4. POC discussed. Questions answered. Will continue to monitor, assess, and update.

## 2021-03-24 ENCOUNTER — PATIENT OUTREACH (OUTPATIENT)
Dept: HEALTH INFORMATION MANAGEMENT | Facility: OTHER | Age: 31
End: 2021-03-24

## 2021-03-24 NOTE — PROGRESS NOTES
Community Health Worker Intake  • Social determinates of health intake complete.   • Contact information provided to Scot Ramírez.  • Outpatient assessment completed.  • Did the patient receive medications post discharge: No    CHW Ranulfo spoke with Scot via TC to follow up post discharge. Reviewed and discussed AVS. No follow up appt yet. Per Scot, he left  for [facility] to establish with PCP. CHW encouraged him to continuously reach out to them. CHW discussed rent assistance. CHW will mail information for [employer] Rent assistance, and [employer]. CHW will text food pantry info. He declined to speak with CCM RN and SW for additional needs, health education, question and concerns. He reports no other needs from CHW. Encouraged Scot to reach out to me when needed. Scot will be d/c from [facility] services.

## 2021-03-25 LAB — NUCLEAR IGG SER QL IA: NORMAL

## 2021-03-26 LAB
BACTERIA BLD CULT: NORMAL
BACTERIA BLD CULT: NORMAL
SIGNIFICANT IND 70042: NORMAL
SIGNIFICANT IND 70042: NORMAL
SITE SITE: NORMAL
SITE SITE: NORMAL
SOURCE SOURCE: NORMAL
SOURCE SOURCE: NORMAL

## 2021-06-23 LAB
ALBUMIN SERPL BCP-MCNC: 4.7 G/DL (ref 3.2–4.9)
ALBUMIN/GLOB SERPL: 1.2 G/DL
ALP SERPL-CCNC: 131 U/L (ref 30–99)
ALT SERPL-CCNC: 80 U/L (ref 2–50)
ANION GAP SERPL CALC-SCNC: 30 MMOL/L (ref 7–16)
AST SERPL-CCNC: 220 U/L (ref 12–45)
BASOPHILS # BLD AUTO: 0.7 % (ref 0–1.8)
BASOPHILS # BLD: 0.07 K/UL (ref 0–0.12)
BILIRUB SERPL-MCNC: 7.2 MG/DL (ref 0.1–1.5)
BUN SERPL-MCNC: 9 MG/DL (ref 8–22)
CALCIUM SERPL-MCNC: 10.1 MG/DL (ref 8.5–10.5)
CHLORIDE SERPL-SCNC: 87 MMOL/L (ref 96–112)
CO2 SERPL-SCNC: 17 MMOL/L (ref 20–33)
CREAT SERPL-MCNC: 0.56 MG/DL (ref 0.5–1.4)
EOSINOPHIL # BLD AUTO: 0.13 K/UL (ref 0–0.51)
EOSINOPHIL NFR BLD: 1.2 % (ref 0–6.9)
ERYTHROCYTE [DISTWIDTH] IN BLOOD BY AUTOMATED COUNT: 46.8 FL (ref 35.9–50)
GLOBULIN SER CALC-MCNC: 3.9 G/DL (ref 1.9–3.5)
GLUCOSE SERPL-MCNC: 102 MG/DL (ref 65–99)
HCT VFR BLD AUTO: 48.5 % (ref 42–52)
HGB BLD-MCNC: 17.7 G/DL (ref 14–18)
IMM GRANULOCYTES # BLD AUTO: 0.06 K/UL (ref 0–0.11)
IMM GRANULOCYTES NFR BLD AUTO: 0.6 % (ref 0–0.9)
LIPASE SERPL-CCNC: 214 U/L (ref 11–82)
LYMPHOCYTES # BLD AUTO: 0.48 K/UL (ref 1–4.8)
LYMPHOCYTES NFR BLD: 4.5 % (ref 22–41)
MCH RBC QN AUTO: 35.4 PG (ref 27–33)
MCHC RBC AUTO-ENTMCNC: 36.5 G/DL (ref 33.7–35.3)
MCV RBC AUTO: 97 FL (ref 81.4–97.8)
MONOCYTES # BLD AUTO: 0.52 K/UL (ref 0–0.85)
MONOCYTES NFR BLD AUTO: 4.9 % (ref 0–13.4)
NEUTROPHILS # BLD AUTO: 9.31 K/UL (ref 1.82–7.42)
NEUTROPHILS NFR BLD: 88.1 % (ref 44–72)
NRBC # BLD AUTO: 0 K/UL
NRBC BLD-RTO: 0 /100 WBC
PLATELET # BLD AUTO: 112 K/UL (ref 164–446)
PMV BLD AUTO: 11.3 FL (ref 9–12.9)
POTASSIUM SERPL-SCNC: 4.9 MMOL/L (ref 3.6–5.5)
PROT SERPL-MCNC: 8.6 G/DL (ref 6–8.2)
RBC # BLD AUTO: 5 M/UL (ref 4.7–6.1)
SODIUM SERPL-SCNC: 134 MMOL/L (ref 135–145)
WBC # BLD AUTO: 10.6 K/UL (ref 4.8–10.8)

## 2021-06-23 PROCEDURE — 99285 EMERGENCY DEPT VISIT HI MDM: CPT

## 2021-06-23 PROCEDURE — 83690 ASSAY OF LIPASE: CPT

## 2021-06-23 PROCEDURE — 85025 COMPLETE CBC W/AUTO DIFF WBC: CPT

## 2021-06-23 PROCEDURE — HZ2ZZZZ DETOXIFICATION SERVICES FOR SUBSTANCE ABUSE TREATMENT: ICD-10-PCS | Performed by: STUDENT IN AN ORGANIZED HEALTH CARE EDUCATION/TRAINING PROGRAM

## 2021-06-23 PROCEDURE — 36415 COLL VENOUS BLD VENIPUNCTURE: CPT

## 2021-06-23 PROCEDURE — 80053 COMPREHEN METABOLIC PANEL: CPT

## 2021-06-23 ASSESSMENT — FIBROSIS 4 INDEX: FIB4 SCORE: 24.3

## 2021-06-24 ENCOUNTER — APPOINTMENT (OUTPATIENT)
Dept: RADIOLOGY | Facility: MEDICAL CENTER | Age: 31
DRG: 377 | End: 2021-06-24
Attending: EMERGENCY MEDICINE
Payer: COMMERCIAL

## 2021-06-24 ENCOUNTER — HOSPITAL ENCOUNTER (INPATIENT)
Facility: MEDICAL CENTER | Age: 31
LOS: 2 days | DRG: 377 | End: 2021-06-26
Attending: EMERGENCY MEDICINE | Admitting: STUDENT IN AN ORGANIZED HEALTH CARE EDUCATION/TRAINING PROGRAM
Payer: COMMERCIAL

## 2021-06-24 PROBLEM — R17 ELEVATED BILIRUBIN: Status: ACTIVE | Noted: 2021-06-24

## 2021-06-24 PROBLEM — K51.00 PANCOLITIS (HCC): Status: ACTIVE | Noted: 2021-06-24

## 2021-06-24 PROBLEM — E87.1 HYPONATREMIA: Status: ACTIVE | Noted: 2021-06-24

## 2021-06-24 PROBLEM — K85.90 PANCREATITIS: Status: ACTIVE | Noted: 2021-06-24

## 2021-06-24 PROBLEM — K92.2 GI BLEED: Status: ACTIVE | Noted: 2021-06-24

## 2021-06-24 LAB
ABO + RH BLD: NORMAL
ABO GROUP BLD: NORMAL
AMMONIA PLAS-SCNC: 44 UMOL/L (ref 11–45)
APAP SERPL-MCNC: <5 UG/ML (ref 10–30)
APPEARANCE UR: ABNORMAL
APTT PPP: 29.7 SEC (ref 24.7–36)
BACTERIA #/AREA URNS HPF: ABNORMAL /HPF
BILIRUB UR QL STRIP.AUTO: ABNORMAL
BLD GP AB SCN SERPL QL: NORMAL
CHOLEST SERPL-MCNC: 134 MG/DL (ref 100–199)
COLOR UR: ABNORMAL
EPI CELLS #/AREA URNS HPF: ABNORMAL /HPF
ETHANOL BLD-MCNC: <10.1 MG/DL (ref 0–10)
GLUCOSE UR STRIP.AUTO-MCNC: NEGATIVE MG/DL
HAV IGM SERPL QL IA: NORMAL
HBV CORE IGM SER QL: NORMAL
HBV SURFACE AG SER QL: NORMAL
HCV AB SER QL: NORMAL
HDLC SERPL-MCNC: 36 MG/DL
HGB BLD-MCNC: 14.1 G/DL (ref 14–18)
HGB BLD-MCNC: 14.3 G/DL (ref 14–18)
HGB BLD-MCNC: 14.6 G/DL (ref 14–18)
HYALINE CASTS #/AREA URNS LPF: ABNORMAL /LPF
INR PPP: 1.27 (ref 0.87–1.13)
KETONES UR STRIP.AUTO-MCNC: >=160 MG/DL
LDLC SERPL CALC-MCNC: 75 MG/DL
LEUKOCYTE ESTERASE UR QL STRIP.AUTO: ABNORMAL
MAGNESIUM SERPL-MCNC: 1.3 MG/DL (ref 1.5–2.5)
MICRO URNS: ABNORMAL
MUCOUS THREADS #/AREA URNS HPF: ABNORMAL /HPF
NITRITE UR QL STRIP.AUTO: NEGATIVE
PH UR STRIP.AUTO: 6.5 [PH] (ref 5–8)
PROT UR QL STRIP: 100 MG/DL
PROTHROMBIN TIME: 15.5 SEC (ref 12–14.6)
RBC # URNS HPF: ABNORMAL /HPF
RBC UR QL AUTO: NEGATIVE
RH BLD: NORMAL
SARS-COV-2 RNA RESP QL NAA+PROBE: NOTDETECTED
SP GR UR STRIP.AUTO: 1.04
SPECIMEN SOURCE: NORMAL
TRIGL SERPL-MCNC: 115 MG/DL (ref 0–149)
UROBILINOGEN UR STRIP.AUTO-MCNC: 1 MG/DL
WBC #/AREA URNS HPF: ABNORMAL /HPF

## 2021-06-24 PROCEDURE — C9113 INJ PANTOPRAZOLE SODIUM, VIA: HCPCS | Performed by: STUDENT IN AN ORGANIZED HEALTH CARE EDUCATION/TRAINING PROGRAM

## 2021-06-24 PROCEDURE — 80143 DRUG ASSAY ACETAMINOPHEN: CPT

## 2021-06-24 PROCEDURE — 82140 ASSAY OF AMMONIA: CPT

## 2021-06-24 PROCEDURE — 700105 HCHG RX REV CODE 258: Performed by: EMERGENCY MEDICINE

## 2021-06-24 PROCEDURE — 76705 ECHO EXAM OF ABDOMEN: CPT

## 2021-06-24 PROCEDURE — 85610 PROTHROMBIN TIME: CPT

## 2021-06-24 PROCEDURE — 700111 HCHG RX REV CODE 636 W/ 250 OVERRIDE (IP): Performed by: EMERGENCY MEDICINE

## 2021-06-24 PROCEDURE — 96376 TX/PRO/DX INJ SAME DRUG ADON: CPT

## 2021-06-24 PROCEDURE — U0003 INFECTIOUS AGENT DETECTION BY NUCLEIC ACID (DNA OR RNA); SEVERE ACUTE RESPIRATORY SYNDROME CORONAVIRUS 2 (SARS-COV-2) (CORONAVIRUS DISEASE [COVID-19]), AMPLIFIED PROBE TECHNIQUE, MAKING USE OF HIGH THROUGHPUT TECHNOLOGIES AS DESCRIBED BY CMS-2020-01-R: HCPCS

## 2021-06-24 PROCEDURE — 96365 THER/PROPH/DIAG IV INF INIT: CPT

## 2021-06-24 PROCEDURE — 770006 HCHG ROOM/CARE - MED/SURG/GYN SEMI*

## 2021-06-24 PROCEDURE — 700111 HCHG RX REV CODE 636 W/ 250 OVERRIDE (IP): Performed by: STUDENT IN AN ORGANIZED HEALTH CARE EDUCATION/TRAINING PROGRAM

## 2021-06-24 PROCEDURE — 85018 HEMOGLOBIN: CPT

## 2021-06-24 PROCEDURE — 85730 THROMBOPLASTIN TIME PARTIAL: CPT

## 2021-06-24 PROCEDURE — 86901 BLOOD TYPING SEROLOGIC RH(D): CPT

## 2021-06-24 PROCEDURE — C9113 INJ PANTOPRAZOLE SODIUM, VIA: HCPCS | Performed by: EMERGENCY MEDICINE

## 2021-06-24 PROCEDURE — 96375 TX/PRO/DX INJ NEW DRUG ADDON: CPT

## 2021-06-24 PROCEDURE — 36415 COLL VENOUS BLD VENIPUNCTURE: CPT

## 2021-06-24 PROCEDURE — 80074 ACUTE HEPATITIS PANEL: CPT

## 2021-06-24 PROCEDURE — 96366 THER/PROPH/DIAG IV INF ADDON: CPT

## 2021-06-24 PROCEDURE — 80061 LIPID PANEL: CPT

## 2021-06-24 PROCEDURE — 83735 ASSAY OF MAGNESIUM: CPT

## 2021-06-24 PROCEDURE — 86850 RBC ANTIBODY SCREEN: CPT

## 2021-06-24 PROCEDURE — 81001 URINALYSIS AUTO W/SCOPE: CPT

## 2021-06-24 PROCEDURE — 86900 BLOOD TYPING SEROLOGIC ABO: CPT

## 2021-06-24 PROCEDURE — U0005 INFEC AGEN DETEC AMPLI PROBE: HCPCS

## 2021-06-24 PROCEDURE — 99223 1ST HOSP IP/OBS HIGH 75: CPT | Performed by: STUDENT IN AN ORGANIZED HEALTH CARE EDUCATION/TRAINING PROGRAM

## 2021-06-24 PROCEDURE — 700101 HCHG RX REV CODE 250: Performed by: INTERNAL MEDICINE

## 2021-06-24 PROCEDURE — 74177 CT ABD & PELVIS W/CONTRAST: CPT

## 2021-06-24 PROCEDURE — 82077 ASSAY SPEC XCP UR&BREATH IA: CPT

## 2021-06-24 PROCEDURE — 700117 HCHG RX CONTRAST REV CODE 255: Performed by: EMERGENCY MEDICINE

## 2021-06-24 PROCEDURE — 700101 HCHG RX REV CODE 250: Performed by: STUDENT IN AN ORGANIZED HEALTH CARE EDUCATION/TRAINING PROGRAM

## 2021-06-24 PROCEDURE — 700105 HCHG RX REV CODE 258: Performed by: STUDENT IN AN ORGANIZED HEALTH CARE EDUCATION/TRAINING PROGRAM

## 2021-06-24 RX ORDER — LORAZEPAM 2 MG/ML
1.5 INJECTION INTRAMUSCULAR
Status: DISCONTINUED | OUTPATIENT
Start: 2021-06-24 | End: 2021-06-26

## 2021-06-24 RX ORDER — LORAZEPAM 2 MG/ML
1 INJECTION INTRAMUSCULAR
Status: DISCONTINUED | OUTPATIENT
Start: 2021-06-24 | End: 2021-06-26

## 2021-06-24 RX ORDER — LORAZEPAM 2 MG/ML
0.5 INJECTION INTRAMUSCULAR ONCE
Status: COMPLETED | OUTPATIENT
Start: 2021-06-24 | End: 2021-06-24

## 2021-06-24 RX ORDER — LORAZEPAM 2 MG/1
2 TABLET ORAL
Status: DISCONTINUED | OUTPATIENT
Start: 2021-06-24 | End: 2021-06-26

## 2021-06-24 RX ORDER — HYDROMORPHONE HYDROCHLORIDE 1 MG/ML
0.5 INJECTION, SOLUTION INTRAMUSCULAR; INTRAVENOUS; SUBCUTANEOUS ONCE
Status: COMPLETED | OUTPATIENT
Start: 2021-06-24 | End: 2021-06-24

## 2021-06-24 RX ORDER — GAUZE BANDAGE 2" X 2"
100 BANDAGE TOPICAL DAILY
Status: DISCONTINUED | OUTPATIENT
Start: 2021-06-25 | End: 2021-06-26 | Stop reason: HOSPADM

## 2021-06-24 RX ORDER — LORAZEPAM 2 MG/ML
2 INJECTION INTRAMUSCULAR
Status: DISCONTINUED | OUTPATIENT
Start: 2021-06-24 | End: 2021-06-26

## 2021-06-24 RX ORDER — HYDROMORPHONE HYDROCHLORIDE 1 MG/ML
0.5 INJECTION, SOLUTION INTRAMUSCULAR; INTRAVENOUS; SUBCUTANEOUS
Status: DISCONTINUED | OUTPATIENT
Start: 2021-06-24 | End: 2021-06-26

## 2021-06-24 RX ORDER — ONDANSETRON 2 MG/ML
4 INJECTION INTRAMUSCULAR; INTRAVENOUS ONCE
Status: COMPLETED | OUTPATIENT
Start: 2021-06-24 | End: 2021-06-24

## 2021-06-24 RX ORDER — LORAZEPAM 1 MG/1
1 TABLET ORAL EVERY 4 HOURS PRN
Status: DISCONTINUED | OUTPATIENT
Start: 2021-06-24 | End: 2021-06-26

## 2021-06-24 RX ORDER — AMOXICILLIN 250 MG
2 CAPSULE ORAL 2 TIMES DAILY
Status: DISCONTINUED | OUTPATIENT
Start: 2021-06-24 | End: 2021-06-26 | Stop reason: HOSPADM

## 2021-06-24 RX ORDER — FOLIC ACID 1 MG/1
1 TABLET ORAL DAILY
Status: DISCONTINUED | OUTPATIENT
Start: 2021-06-25 | End: 2021-06-26 | Stop reason: HOSPADM

## 2021-06-24 RX ORDER — PROMETHAZINE HYDROCHLORIDE 25 MG/1
12.5-25 SUPPOSITORY RECTAL EVERY 4 HOURS PRN
Status: DISCONTINUED | OUTPATIENT
Start: 2021-06-24 | End: 2021-06-26 | Stop reason: HOSPADM

## 2021-06-24 RX ORDER — PROCHLORPERAZINE EDISYLATE 5 MG/ML
5-10 INJECTION INTRAMUSCULAR; INTRAVENOUS EVERY 4 HOURS PRN
Status: DISCONTINUED | OUTPATIENT
Start: 2021-06-24 | End: 2021-06-26 | Stop reason: HOSPADM

## 2021-06-24 RX ORDER — SODIUM CHLORIDE, SODIUM LACTATE, POTASSIUM CHLORIDE, CALCIUM CHLORIDE 600; 310; 30; 20 MG/100ML; MG/100ML; MG/100ML; MG/100ML
1000 INJECTION, SOLUTION INTRAVENOUS ONCE
Status: COMPLETED | OUTPATIENT
Start: 2021-06-24 | End: 2021-06-24

## 2021-06-24 RX ORDER — SODIUM CHLORIDE 9 MG/ML
INJECTION, SOLUTION INTRAVENOUS CONTINUOUS
Status: DISCONTINUED | OUTPATIENT
Start: 2021-06-24 | End: 2021-06-26

## 2021-06-24 RX ORDER — MAGNESIUM SULFATE HEPTAHYDRATE 40 MG/ML
2 INJECTION, SOLUTION INTRAVENOUS ONCE
Status: COMPLETED | OUTPATIENT
Start: 2021-06-24 | End: 2021-06-24

## 2021-06-24 RX ORDER — BISACODYL 10 MG
10 SUPPOSITORY, RECTAL RECTAL
Status: DISCONTINUED | OUTPATIENT
Start: 2021-06-24 | End: 2021-06-26 | Stop reason: HOSPADM

## 2021-06-24 RX ORDER — ONDANSETRON 4 MG/1
4 TABLET, ORALLY DISINTEGRATING ORAL EVERY 4 HOURS PRN
Status: DISCONTINUED | OUTPATIENT
Start: 2021-06-24 | End: 2021-06-26 | Stop reason: HOSPADM

## 2021-06-24 RX ORDER — PANTOPRAZOLE SODIUM 40 MG/10ML
40 INJECTION, POWDER, LYOPHILIZED, FOR SOLUTION INTRAVENOUS 2 TIMES DAILY
Status: DISCONTINUED | OUTPATIENT
Start: 2021-06-24 | End: 2021-06-26 | Stop reason: HOSPADM

## 2021-06-24 RX ORDER — LORAZEPAM 2 MG/ML
0.5 INJECTION INTRAMUSCULAR EVERY 4 HOURS PRN
Status: DISCONTINUED | OUTPATIENT
Start: 2021-06-24 | End: 2021-06-26

## 2021-06-24 RX ORDER — PROMETHAZINE HYDROCHLORIDE 25 MG/1
12.5-25 TABLET ORAL EVERY 4 HOURS PRN
Status: DISCONTINUED | OUTPATIENT
Start: 2021-06-24 | End: 2021-06-26 | Stop reason: HOSPADM

## 2021-06-24 RX ORDER — POLYETHYLENE GLYCOL 3350 17 G/17G
1 POWDER, FOR SOLUTION ORAL
Status: DISCONTINUED | OUTPATIENT
Start: 2021-06-24 | End: 2021-06-26 | Stop reason: HOSPADM

## 2021-06-24 RX ORDER — PANTOPRAZOLE SODIUM 40 MG/10ML
40 INJECTION, POWDER, LYOPHILIZED, FOR SOLUTION INTRAVENOUS DAILY
Status: DISCONTINUED | OUTPATIENT
Start: 2021-06-24 | End: 2021-06-24

## 2021-06-24 RX ORDER — ONDANSETRON 2 MG/ML
4 INJECTION INTRAMUSCULAR; INTRAVENOUS EVERY 4 HOURS PRN
Status: DISCONTINUED | OUTPATIENT
Start: 2021-06-24 | End: 2021-06-26 | Stop reason: HOSPADM

## 2021-06-24 RX ORDER — LORAZEPAM 2 MG/1
4 TABLET ORAL
Status: DISCONTINUED | OUTPATIENT
Start: 2021-06-24 | End: 2021-06-26

## 2021-06-24 RX ORDER — LORAZEPAM 0.5 MG/1
0.5 TABLET ORAL EVERY 4 HOURS PRN
Status: DISCONTINUED | OUTPATIENT
Start: 2021-06-24 | End: 2021-06-26

## 2021-06-24 RX ADMIN — MAGNESIUM SULFATE 2 G: 2 INJECTION INTRAVENOUS at 11:26

## 2021-06-24 RX ADMIN — PROCHLORPERAZINE EDISYLATE 10 MG: 5 INJECTION INTRAMUSCULAR; INTRAVENOUS at 19:32

## 2021-06-24 RX ADMIN — ONDANSETRON 4 MG: 2 INJECTION INTRAMUSCULAR; INTRAVENOUS at 11:55

## 2021-06-24 RX ADMIN — IOHEXOL 80 ML: 350 INJECTION, SOLUTION INTRAVENOUS at 04:13

## 2021-06-24 RX ADMIN — SODIUM CHLORIDE, POTASSIUM CHLORIDE, SODIUM LACTATE AND CALCIUM CHLORIDE 1000 ML: 600; 310; 30; 20 INJECTION, SOLUTION INTRAVENOUS at 02:37

## 2021-06-24 RX ADMIN — ONDANSETRON 4 MG: 2 INJECTION INTRAMUSCULAR; INTRAVENOUS at 01:47

## 2021-06-24 RX ADMIN — ONDANSETRON 4 MG: 2 INJECTION INTRAMUSCULAR; INTRAVENOUS at 21:58

## 2021-06-24 RX ADMIN — PANTOPRAZOLE SODIUM 40 MG: 40 INJECTION, POWDER, LYOPHILIZED, FOR SOLUTION INTRAVENOUS at 05:43

## 2021-06-24 RX ADMIN — HYDROMORPHONE HYDROCHLORIDE 0.5 MG: 1 INJECTION, SOLUTION INTRAMUSCULAR; INTRAVENOUS; SUBCUTANEOUS at 14:54

## 2021-06-24 RX ADMIN — SODIUM CHLORIDE, POTASSIUM CHLORIDE, SODIUM LACTATE AND CALCIUM CHLORIDE 1000 ML: 600; 310; 30; 20 INJECTION, SOLUTION INTRAVENOUS at 01:47

## 2021-06-24 RX ADMIN — HYDROMORPHONE HYDROCHLORIDE 0.5 MG: 1 INJECTION, SOLUTION INTRAMUSCULAR; INTRAVENOUS; SUBCUTANEOUS at 03:41

## 2021-06-24 RX ADMIN — SODIUM CHLORIDE 80 MG: 9 INJECTION, SOLUTION INTRAVENOUS at 03:33

## 2021-06-24 RX ADMIN — HYDROMORPHONE HYDROCHLORIDE 0.5 MG: 1 INJECTION, SOLUTION INTRAMUSCULAR; INTRAVENOUS; SUBCUTANEOUS at 11:50

## 2021-06-24 RX ADMIN — SODIUM CHLORIDE: 9 INJECTION, SOLUTION INTRAVENOUS at 11:27

## 2021-06-24 RX ADMIN — POLYETHYLENE GLYCOL 3350, SODIUM SULFATE ANHYDROUS, SODIUM BICARBONATE, SODIUM CHLORIDE, POTASSIUM CHLORIDE 4 L: 236; 22.74; 6.74; 5.86; 2.97 POWDER, FOR SOLUTION ORAL at 16:26

## 2021-06-24 RX ADMIN — HYDROMORPHONE HYDROCHLORIDE 0.5 MG: 1 INJECTION, SOLUTION INTRAMUSCULAR; INTRAVENOUS; SUBCUTANEOUS at 19:32

## 2021-06-24 RX ADMIN — LORAZEPAM 0.5 MG: 2 INJECTION INTRAMUSCULAR; INTRAVENOUS at 05:43

## 2021-06-24 RX ADMIN — LORAZEPAM 0.5 MG: 2 INJECTION INTRAMUSCULAR; INTRAVENOUS at 03:41

## 2021-06-24 RX ADMIN — HYDROMORPHONE HYDROCHLORIDE 0.5 MG: 1 INJECTION, SOLUTION INTRAMUSCULAR; INTRAVENOUS; SUBCUTANEOUS at 22:43

## 2021-06-24 RX ADMIN — HYDROMORPHONE HYDROCHLORIDE 0.5 MG: 1 INJECTION, SOLUTION INTRAMUSCULAR; INTRAVENOUS; SUBCUTANEOUS at 06:38

## 2021-06-24 RX ADMIN — PANTOPRAZOLE SODIUM 40 MG: 40 INJECTION, POWDER, LYOPHILIZED, FOR SOLUTION INTRAVENOUS at 17:45

## 2021-06-24 RX ADMIN — HYDROMORPHONE HYDROCHLORIDE 0.5 MG: 1 INJECTION, SOLUTION INTRAMUSCULAR; INTRAVENOUS; SUBCUTANEOUS at 01:47

## 2021-06-24 RX ADMIN — ONDANSETRON 4 MG: 2 INJECTION INTRAMUSCULAR; INTRAVENOUS at 16:43

## 2021-06-24 RX ADMIN — THIAMINE HYDROCHLORIDE: 100 INJECTION, SOLUTION INTRAMUSCULAR; INTRAVENOUS at 07:04

## 2021-06-24 ASSESSMENT — LIFESTYLE VARIABLES
ON A TYPICAL DAY WHEN YOU DRINK ALCOHOL HOW MANY DRINKS DO YOU HAVE: 5
TOTAL SCORE: 3
HEADACHE, FULLNESS IN HEAD: NOT PRESENT
TOTAL SCORE: 4
PAROXYSMAL SWEATS: NO SWEAT VISIBLE
TOTAL SCORE: 5
TOTAL SCORE: 3
AUDITORY DISTURBANCES: NOT PRESENT
ANXIETY: MILDLY ANXIOUS
ORIENTATION AND CLOUDING OF SENSORIUM: ORIENTED AND CAN DO SERIAL ADDITIONS
ALCOHOL_USE: YES
TREMOR: NO TREMOR
ANXIETY: MILDLY ANXIOUS
VISUAL DISTURBANCES: NOT PRESENT
TOTAL SCORE: 4
HEADACHE, FULLNESS IN HEAD: NOT PRESENT
EVER FELT BAD OR GUILTY ABOUT YOUR DRINKING: NO
ANXIETY: MILDLY ANXIOUS
DOES PATIENT WANT TO TALK TO SOMEONE ABOUT QUITTING: NO
EVER HAD A DRINK FIRST THING IN THE MORNING TO STEADY YOUR NERVES TO GET RID OF A HANGOVER: YES
AGITATION: NORMAL ACTIVITY
VISUAL DISTURBANCES: NOT PRESENT
AUDITORY DISTURBANCES: NOT PRESENT
PAROXYSMAL SWEATS: NO SWEAT VISIBLE
HAVE PEOPLE ANNOYED YOU BY CRITICIZING YOUR DRINKING: YES
HOW MANY TIMES IN THE PAST YEAR HAVE YOU HAD 5 OR MORE DRINKS IN A DAY: 365
TREMOR: NO TREMOR
NAUSEA AND VOMITING: *
AGITATION: NORMAL ACTIVITY
ORIENTATION AND CLOUDING OF SENSORIUM: ORIENTED AND CAN DO SERIAL ADDITIONS
HEADACHE, FULLNESS IN HEAD: NOT PRESENT
PAROXYSMAL SWEATS: NO SWEAT VISIBLE
AUDITORY DISTURBANCES: NOT PRESENT
NAUSEA AND VOMITING: *
CONSUMPTION TOTAL: POSITIVE
NAUSEA AND VOMITING: INTERMITTENT NAUSEA WITH DRY HEAVES
VISUAL DISTURBANCES: NOT PRESENT
HAVE YOU EVER FELT YOU SHOULD CUT DOWN ON YOUR DRINKING: YES
TOTAL SCORE: 3
TREMOR: NO TREMOR
AGITATION: NORMAL ACTIVITY
ORIENTATION AND CLOUDING OF SENSORIUM: ORIENTED AND CAN DO SERIAL ADDITIONS
AVERAGE NUMBER OF DAYS PER WEEK YOU HAVE A DRINK CONTAINING ALCOHOL: 7
DOES PATIENT WANT TO STOP DRINKING: YES

## 2021-06-24 ASSESSMENT — COGNITIVE AND FUNCTIONAL STATUS - GENERAL
MOBILITY SCORE: 24
DAILY ACTIVITIY SCORE: 24
SUGGESTED CMS G CODE MODIFIER MOBILITY: CH
SUGGESTED CMS G CODE MODIFIER DAILY ACTIVITY: CH

## 2021-06-24 ASSESSMENT — ENCOUNTER SYMPTOMS
FEVER: 0
COUGH: 0
SHORTNESS OF BREATH: 0
PALPITATIONS: 0
CHILLS: 0
EYE PAIN: 0
WEIGHT LOSS: 0
WHEEZING: 0
ORTHOPNEA: 0
MYALGIAS: 0
HEMOPTYSIS: 0
WEAKNESS: 1
LOSS OF CONSCIOUSNESS: 0
ROS GI COMMENTS: HEMATEMESIS
DIZZINESS: 0
DIARRHEA: 0
NAUSEA: 1
VOMITING: 1
ABDOMINAL PAIN: 1
SORE THROAT: 0
BLURRED VISION: 0
BLOOD IN STOOL: 0
CONSTIPATION: 0

## 2021-06-24 ASSESSMENT — PAIN DESCRIPTION - PAIN TYPE
TYPE: ACUTE PAIN

## 2021-06-24 ASSESSMENT — PATIENT HEALTH QUESTIONNAIRE - PHQ9
2. FEELING DOWN, DEPRESSED, IRRITABLE, OR HOPELESS: NOT AT ALL
1. LITTLE INTEREST OR PLEASURE IN DOING THINGS: NOT AT ALL
SUM OF ALL RESPONSES TO PHQ9 QUESTIONS 1 AND 2: 0

## 2021-06-24 NOTE — ASSESSMENT & PLAN NOTE
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 Dilated from 6 to 10 mm. Broad superficial tearing at site of stricture after dilation with self-limited oozing. Severe LA grade D ulcerative / erosive esophagitis biopsied at ulcer edges. Stomach:  Sever patchy hemorrhagic gastritis (biopsies taken for H.pylori at body antrum junction).   GI rec PPI bid, cbc, iron studies, follow up with GI outpatient in 4 weeks. Suspecting catherine Francisco J syndrome

## 2021-06-24 NOTE — ASSESSMENT & PLAN NOTE
Patient with new onset diffuse abdominal pains, nausea, vomiting  Noted to have elevated lipase of 214, does not meet criteria for acute pancreatitis despite the history of pancreatitis.  The patient does not have lipase over 3 times upper bit of normal or CT findings of pancreatitis, or classic epigastric pain radiating to the back.    On IVF  Clear liquid diet per GI

## 2021-06-24 NOTE — CONSULTS
DATE OF SERVICE:  06/24/2021     REASON FOR CONSULTATION:  GI bleed.     HISTORY OF PRESENT ILLNESS:  The patient is a 31-year-old male who has a   history of alcohol abuse.  The patient has had alcoholic pancreatitis in the   past and the patient comes in with a 1-week history of diffuse abdominal pain   as well as nausea, vomiting, hematemesis and hematuria.  The patient underwent   CT of the abdomen, which showed pancolitis.  The patient reports intermittent   loose stools, but denies any current hematochezia.  The patient has never had   a colonoscopy or upper endoscopy.  The patient continues to drink heavily,   although he does not quantify the amount.  The patient reports his last drink   was approximately 4 days ago.  The patient has had withdrawals in the past.     PAST MEDICAL HISTORY:  Asthma, H. pylori, alcohol abuse, alcoholic   pancreatitis, alcoholic hepatitis.     PAST SURGICAL HISTORY:  None.     FAMILY HISTORY:  Noncontributory.     SOCIAL HISTORY:  The patient reports current alcohol use, last drink 4 days   ago.  The patient reports heavy alcohol use, although does not quantify at   this time.  The patient reports he goes up and down.     ALLERGIES:  MORPHINE, GI COCKTAIL AND PENICILLINS.     MEDICATIONS:  Albuterol.     REVIEW OF SYSTEMS:  A 14-point review of systems was obtained and found to be   negative except for those above in the HPI.     PHYSICAL EXAMINATION:    GENERAL:  No acute distress, alert, awake and oriented x3.  VITAL SIGNS:  Stable.  HEENT:  PERRLA.  Extraocular movements are intact.  Sclerae icteric.  HEART:  Regular rate and rhythm.  LUNGS:  Clear to auscultation bilaterally.  ABDOMEN:  Soft, but tender in the lower abdomen diffusely.  No guarding or   rebound.  MUSCULOSKELETAL:  No edema noted on bilateral lower extremities.  NEUROLOGIC:  Grossly intact.  PSYCHIATRIC:  Affect is normal.  SKIN:  Mild jaundice.  No pallor.     RESULTS:  LABORATORY DATA:  White count 10.6,  hemoglobin 14.6, platelets 112.  Sodium   134, potassium 4.9, BUN 9, creatinine 0.56, , ALT 80, alkaline   phosphatase 131, total bilirubin 7.2, lipase 214.     IMAGING:  CT abdomen and pelvis with contrast shows diffuse wall thickening   throughout the entire colon, could relate to pancolitis, hepatic steatosis.     ASSESSMENT AND PLAN:    1.  Alcoholic hepatitis.  The patient has a history of alcohol abuse, yet he   persists with his drinking.  The patient's Maddrey's discriminant function is   17.3.  His MELD sodium is 20 indicating a 7-10% 90-day mortality rate.  This   information was passed along to the patient to try quantify the damage to the   patient's liver.  I have counseled at length the importance for cessation of   alcohol and if the patient is unable to do it himself, recommend   rehabilitation one way or another.  The patient understands if he continues   drinking the way he is, he will likely pass away at a young age.  The patient   understands and reports he is very willing to quit and to go through what it   takes to do that; however, the patient has been counseled numerous times in   the past for the same thing and has had prior medical complications of alcohol   abuse such as alcoholic pancreatitis and alcoholic hepatitis.  Continue with   IV fluids, clear liquid diet okay for now, no need for prednisolone currently   as the patient's Maddrey's discriminant function is 17.3.  2.  Fatty liver secondary to alcohol abuse, see above.  Counseled cessation.  3.  GI bleed.  The patient has had multiple episodes of hematemesis.  Suspect   gastritis versus Leslie-Funk tear.  Recommend antiemetics as needed.  Clear   liquid diet okay for today, n.p.o. after midnight.  EGD in the morning.  4.  Pancolitis. Abnormal CT finding of possible pancolitis in addition to   diffuse abdominal discomfort over the past week.  We will tentatively put the   patient on for colonoscopy at the same time his EGD in  the morning; however,   with the patient's recent nausea and vomiting, I suspect it may be difficult   having him get completely prepped. The patient and nurse have been notified   and if the patient is unable to tolerate prep because of persistent nausea and   vomiting, we will plan to just do EGD and do colonoscopy at a later time.  5.  History of alcohol abuse.  The patient persists drinking.  See above for   discussion.  6.  Hyperbilirubinemia secondary to alcoholic hepatitis.  See above.   7.  Generalized abdominal pain could be related to nausea and vomiting   episodes and straining versus possible pancolitis.  The patient does have an   elevated lipase; however, does not meet criteria for acute pancreatitis   despite the history of pancreatitis.  The patient does not have lipase over 3   times upper bit of normal or CT findings of pancreatitis, or classic   epigastric pain radiating to the back.  However, I do recommend continuing   with aggressive IV fluids.  See above for plan.        ______________________________  DO VISHAL Bush    DD:  06/24/2021 10:36  DT:  06/24/2021 11:18    Job#:  [id number]

## 2021-06-24 NOTE — H&P
[facility] Medicine History & Physical Note    Date of Service  6/24/2021    Primary Care Physician  Pcp Pt States None    Consultants  None    Code Status  Full Code    Chief Complaint  Chief Complaint   Patient presents with   • Vomiting     Pt complains of mid ab pain, vomiting, and poor oral intake x 5-6 days. Normally daily drinker, last drink yesterday morning    • Blood in Urine     Starting 2 days ago        History of Presenting Illness  31 y.o. male with PMH of asthma and alcohol abuse.  Complains of diffuse abdominal pain, who presented 6/24/2021 with nausea, vomiting, hematuria, and hematemesis.  Patient reports that he has history of heavy alcohol use, and history of pancreatitis in the past.  Patient reports that he is again having diffuse abdominal pain for the past 7 days.  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  He reports that during these hematemesis events he thinks he produces about half a cup of blood each time.  He denies any prior history of GI bleeding in the past or bleeding tendencies.  He additionally states that because of his nausea and vomiting he has not been able to keep any food or liquid down since his symptoms started.  Additionally, about 2 days ago he noticed that his eyes looked little bit more yellow than normal and he additionally was having ledy bright red blood in his urine.  He additionally endorses some bilateral flank pain.  He denies any diarrhea, constipation, melena, or hematochezia.  He did not not take any medications for symptoms.  Of note, patient reports that he is a heavy drinker and he loses track of how much he drinks.  He reports it varies anywhere from a few beers and shots a day to a pint of hard alcohol.  He reports his last drink was about 2 days ago.  He does admit to having an incidence of withdrawal symptoms in the past while he was in [place].  He reports drinking light beer to help  "take the edge off his symptoms.  He denies having any prior history of seizures from alcohol withdrawal or use in the past.    ED: Temp 96, HR 10 9-57.  Platelets 112, , CL 87, CO2 17, glucose 102, , ALT 80, alk phos 131, total bilirubin 7.2, lipase 214.  Ultrasound right upper quadrant was performed with findings of hepatic steatosis.  CT abdomen also performed with diffuse colonic wall thickening, as well is against findings of hepatic steatosis.  Patient was given Protonix 40 mg, Dilaudid 0.5 mg x 2, LR 1 L x 2, Ativan 0.5 mg, and Zofran 4 mg.    Review of Systems  Review of Systems   Constitutional: Positive for malaise/fatigue. Negative for chills, fever and weight loss.   HENT: Negative for hearing loss and sore throat.    Eyes: Negative for blurred vision and pain.   Respiratory: Negative for cough, hemoptysis, shortness of breath and wheezing.    Cardiovascular: Negative for chest pain, palpitations, orthopnea and leg swelling.   Gastrointestinal: Positive for abdominal pain, nausea and vomiting. Negative for blood in stool, constipation and diarrhea.        Hematemesis   Genitourinary: Negative for dysuria and hematuria.        Dark almost red urine   Musculoskeletal: Negative for joint pain and myalgias.   Skin: Negative for rash.   Neurological: Positive for weakness (Diffuse weakness). Negative for dizziness and loss of consciousness.       Past Medical History   has a past medical history of Asthma and Helicobacter pylori (H. pylori) infection.    Surgical History  No prior past surgical history    Family History  Family history is unknown by patient.     Social History   reports that he has been smoking. He has never used smokeless tobacco. He reports current alcohol use. He reports current drug use.    Allergies  Allergies   Allergen Reactions   • Morphine Unspecified     Pt sts allergic rxn to morphine is hallucinations.    • Other Drug Swelling     GI Cocktail- \"throat swells\"   • " Penicillins Hives       Medications  Prior to Admission Medications   Prescriptions Last Dose Informant Patient Reported? Taking?   albuterol 108 (90 Base) MCG/ACT Aero Soln inhalation aerosol Not Taking at Not Taking Patient No No   Sig: Inhale 2 Puffs by mouth every 6 hours as needed for Shortness of Breath (cough).   Patient not taking: Reported on 3/21/2021      Facility-Administered Medications: None       Physical Exam  Temp:  [35.6 °C (96 °F)] 35.6 °C (96 °F)  Pulse:  [] 79  Resp:  [11-20] 15  BP: (116-140)/(70-99) 127/85  SpO2:  [91 %-97 %] 91 %    Physical Exam  Vitals and nursing note reviewed.   Constitutional:       General: He is not in acute distress.     Appearance: Normal appearance.   HENT:      Mouth/Throat:      Mouth: Mucous membranes are dry.   Eyes:      Extraocular Movements: Extraocular movements intact.   Cardiovascular:      Rate and Rhythm: Regular rhythm. Tachycardia present.      Heart sounds: No murmur heard.   No gallop.    Pulmonary:      Effort: Pulmonary effort is normal.      Breath sounds: Normal breath sounds. No wheezing, rhonchi or rales.   Abdominal:      General: Abdomen is flat. Bowel sounds are normal. There is no distension.      Palpations: Abdomen is soft.      Tenderness: There is abdominal tenderness (Diffuse).   Musculoskeletal:         General: No deformity. Normal range of motion.      Right lower leg: No edema.      Left lower leg: No edema.   Neurological:      General: No focal deficit present.      Mental Status: He is alert and oriented to person, place, and time.         Laboratory:  Recent Labs     06/23/21 2232   WBC 10.6   RBC 5.00   HEMOGLOBIN 17.7   HEMATOCRIT 48.5   MCV 97.0   MCH 35.4*   MCHC 36.5*   RDW 46.8   PLATELETCT 112*   MPV 11.3     Recent Labs     06/23/21 2232   SODIUM 134*   POTASSIUM 4.9   CHLORIDE 87*   CO2 17*   GLUCOSE 102*   BUN 9   CREATININE 0.56   CALCIUM 10.1     Recent Labs     06/23/21 2232   ALTSGPT 80*   ASTSGOT 220*    ALKPHOSPHAT 131*   TBILIRUBIN 7.2*   LIPASE 214*   GLUCOSE 102*         No results for input(s): NTPROBNP in the last 72 hours.      No results for input(s): TROPONINT in the last 72 hours.    Imaging:  CT-ABDOMEN-PELVIS WITH   Final Result         1. Diffuse wall thickening throughout the entire colon could relate to pancolitis.   2. Hepatic steatosis.      US-RUQ   Final Result      1. Echogenic liver, most commonly hepatic steatosis.               Assessment/Plan:  I anticipate this patient will require at least two midnights for appropriate medical management, necessitating inpatient admission.    * GI bleed- (present on admission)  Assessment & Plan  Patient reports significant abdominal pain as well as multiple incidences of nausea and vomiting  States he had 2 instances of hematemesis where he vomited half a cup of bright red blood  Denies any prior history of GI bleeding in the past  Does have history of alcoholic gastritis  S/p Protonix 40 mg and LR 2 L  -Continue with  cc/h  -Protonix 40 mg IV twice daily  -Trend H&H  -N.p.o.  -Hold all chemical VTE/AC  -Type and screen  -Recommend GI consult    Hyponatremia  Assessment & Plan  Sodium 134  -Continue with IV fluids  -Monitor    Elevated bilirubin- (present on admission)  Assessment & Plan  New onset jaundice for the past 2 days  Noted to have a total bilirubin of 7.2, previously within normal limits  LFTs also elevated, appears similar to prior lab values  Ultrasound right upper quadrant and CT abdomen with hepatic steatosis, otherwise no significant hepatic or biliary findings  -Unclear cause of significantly elevated total bilirubin, patient may benefit from GI consult as patient also with complaints of hematemesis.      Pancreatitis- (present on admission)  Assessment & Plan  Patient with new onset diffuse abdominal pains, nausea, vomiting  Reports poor p.o. intake x7 days  Noted to have elevated lipase of 214  Possible mild pancreatitis  -  cc/h  -N.p.o.  -Analgesics and Zofran for symptom management  -Check lipid panel and alcohol level  -Hold antibiotics for now as no obvious signs of infection      Alcohol abuse- (present on admission)  Assessment & Plan  Patient with significant alcohol abuse  Patient reports he drinks anywhere from a few shots and a couple beers a day to a pint of vodka a day.  Patient is unsure as he loses track.  Last drink was almost 2 days prior to arrival  Patient does report going through withdrawals while he was in [place], he reports just drinking more alcohol to take over the symptoms.  Patient denies any prior history of seizures from alcohol use.  - Alcohol level, Utox  - CIWA protocol  - Multivitamin and RALLY Bag  - Thiamine 100mg qd x4 doses and Folic Acid 1mg qd x4 doses  - Seizure/Fall precautions  - Alcohol cessation counseling

## 2021-06-24 NOTE — ED NOTES
"Patient reminded of need for urine sample. Patient states its going to be \"a while.\" Fluids placed on a pressure bag   "

## 2021-06-24 NOTE — PROGRESS NOTES
[facility] Medicine Daily Progress Note    Date of Service  6/24/2021    Chief Complaint  31 y.o. male admitted 6/24/2021 with hematemesis    Hospital Course  31 y.o. male with PMH of asthma and alcohol abuse.  Complains of diffuse abdominal pain, who presented 6/24/2021 with nausea, vomiting, hematuria, and hematemesis.  Patient reports that he has history of heavy alcohol use, and history of pancreatitis in the past.  Patient reports that he is again having diffuse abdominal pain for the past 7 days.  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  He reports that during these hematemesis events he thinks he produces about half a cup of blood each time.  He denies any prior history of GI bleeding in the past or bleeding tendencies.  He additionally states that because of his nausea and vomiting he has not been able to keep any food or liquid down since his symptoms started.  Additionally, about 2 days ago he noticed that his eyes looked little bit more yellow than normal and he additionally was having ledy bright red blood in his urine.  He additionally endorses some bilateral flank pain.  He denies any diarrhea, constipation, melena, or hematochezia.  He did not not take any medications for symptoms.  Of note, patient reports that he is a heavy drinker and he loses track of how much he drinks.  He reports it varies anywhere from a few beers and shots a day to a pint of hard alcohol.  He reports his last drink was about 2 days ago.  He does admit to having an incidence of withdrawal symptoms in the past while he was in [place].  He reports drinking light beer to help take the edge off his symptoms.  He denies having any prior history of seizures from alcohol withdrawal or use in the past.     ED: Temp 96, HR 10 9-57.  Platelets 112, , CL 87, CO2 17, glucose 102, , ALT 80, alk phos 131, total bilirubin 7.2, lipase 214.  Ultrasound right upper  quadrant was performed with findings of hepatic steatosis.  CT abdomen also performed with diffuse colonic wall thickening, as well is against findings of hepatic steatosis.  Patient was given Protonix 40 mg, Dilaudid 0.5 mg x 2, LR 1 L x 2, Ativan 0.5 mg, and Zofran 4 mg.    Interval Problem Update  Patient denies any further episodes of hematemesis.  He reports that nausea vomiting have improved.  GI was consulted they are recommending EGD and colonoscopy tomorrow.  Patient does not tolerate the colon prep will need to do that as an outpatient.  Vitals are stable.  Hemoglobin 14.6 on repeat    Consultants/Specialty  GI Dr Jackson    Code Status  Full Code    Disposition  EGD and colonoscopy on 6/25. Dc home in 1-2 days    Review of Systems  Review of Systems   Constitutional: Negative for chills and fever.   Respiratory: Negative for shortness of breath.    Cardiovascular: Negative for chest pain and palpitations.   Gastrointestinal: Positive for abdominal pain, nausea and vomiting.   All other systems reviewed and are negative.       Physical Exam  Temp:  [35.6 °C (96 °F)] 35.6 °C (96 °F)  Pulse:  [] 71  Resp:  [11-20] 12  BP: (116-140)/(70-99) 121/77  SpO2:  [91 %-97 %] 92 %    Physical Exam  Vitals and nursing note reviewed.   Constitutional:       General: He is not in acute distress.     Appearance: He is not ill-appearing.   HENT:      Head: Normocephalic and atraumatic.   Eyes:      General: Scleral icterus present.         Right eye: No discharge.         Left eye: No discharge.   Cardiovascular:      Rate and Rhythm: Normal rate and regular rhythm.   Pulmonary:      Effort: No respiratory distress.      Breath sounds: No wheezing or rales.   Abdominal:      General: There is no distension.      Tenderness: There is no abdominal tenderness. There is no guarding.   Skin:     General: Skin is warm and dry.      Coloration: Skin is jaundiced.   Neurological:      Mental Status: He is alert.          Fluids    Intake/Output Summary (Last 24 hours) at 6/24/2021 1239  Last data filed at 6/24/2021 0933  Gross per 24 hour   Intake 1000 ml   Output --   Net 1000 ml       Laboratory  Recent Labs     06/23/21 2232 06/24/21  0640   WBC 10.6  --    RBC 5.00  --    HEMOGLOBIN 17.7 14.6   HEMATOCRIT 48.5  --    MCV 97.0  --    MCH 35.4*  --    MCHC 36.5*  --    RDW 46.8  --    PLATELETCT 112*  --    MPV 11.3  --      Recent Labs     06/23/21 2232   SODIUM 134*   POTASSIUM 4.9   CHLORIDE 87*   CO2 17*   GLUCOSE 102*   BUN 9   CREATININE 0.56   CALCIUM 10.1     Recent Labs     06/24/21 0625   APTT 29.7   INR 1.27*         Recent Labs     06/24/21 0625   TRIGLYCERIDE 115   HDL 36*   LDL 75       Imaging  CT-ABDOMEN-PELVIS WITH   Final Result         1. Diffuse wall thickening throughout the entire colon could relate to pancolitis.   2. Hepatic steatosis.      US-RUQ   Final Result      1. Echogenic liver, most commonly hepatic steatosis.              Assessment/Plan  * GI bleed- (present on admission)  Assessment & Plan  Patient reports significant abdominal pain as well as multiple incidences of nausea and vomiting  States he had 2 instances of hematemesis where he vomited half a cup of bright red blood  Denies any prior history of GI bleeding in the past  Does have history of alcoholic gastritis  S/p Protonix 40 mg and LR 2 L  -Continue with  cc/h  -Protonix 40 mg IV twice daily  -Trend H&H  -N.p.o.  -Hold all chemical VTE/AC  -Type and screen  -Recommend GI consult    Pancolitis (HCC)  Assessment & Plan  Pancolitis seen on CT.  GI consulted  GI recommending colonoscopy if he can tolerate the prep.    Hyponatremia  Assessment & Plan  Sodium 134  -Continue with IV fluids  -Monitor    Elevated bilirubin- (present on admission)  Assessment & Plan  New onset jaundice for the past 2 days  Noted to have a total bilirubin of 7.2, previously within normal limits  LFTs also elevated, appears similar to prior lab  values  Ultrasound right upper quadrant and CT abdomen with hepatic steatosis, otherwise no significant hepatic or biliary findings  -Unclear cause of significantly elevated total bilirubin, patient may benefit from GI consult as patient also with complaints of hematemesis.      Pancreatitis- (present on admission)  Assessment & Plan  Patient with new onset diffuse abdominal pains, nausea, vomiting  Reports poor p.o. intake x7 days  Noted to have elevated lipase of 214  Possible mild pancreatitis  - cc/h  -N.p.o.  -Analgesics and Zofran for symptom management  -Check lipid panel and alcohol level  -Hold antibiotics for now as no obvious signs of infection      Alcoholic hepatitis without ascites- (present on admission)  Assessment & Plan  Madrey score 17.3.  Will not start steroids at this time. MELD 20  GI following  Patient understands that he needs to quit drinking otherwise he will likely have a premature death due to liver failure.    Alcohol abuse- (present on admission)  Assessment & Plan  Patient with significant alcohol abuse  Patient reports he drinks anywhere from a few shots and a couple beers a day to a pint of vodka a day.  Patient is unsure as he loses track.  Last drink was almost 2 days prior to arrival  Patient does report going through withdrawals while he was in [place], he reports just drinking more alcohol to take over the symptoms.  Patient denies any prior history of seizures from alcohol use.  - Alcohol level, Utox  - CIWA protocol  - Multivitamin and RALLY Bag  - Thiamine 100mg qd x4 doses and Folic Acid 1mg qd x4 doses  - Seizure/Fall precautions  - Alcohol cessation counseling           VTE prophylaxis: SCDs

## 2021-06-24 NOTE — ED TRIAGE NOTES
Patient is currently vomiting in the lobby, about 100cc out at this time  Gave patient new vomit bag

## 2021-06-24 NOTE — PROGRESS NOTES
Pt arrived on unit via gurney accompanied by transport team. Patient belongings at bedside, offered safekeeping, patient refused. Pt oriented to unit, call light system, and call light usage. Chart reviewed, labs reviewed, will continue to monitor. Pt is A/O x4 . 2 RN skin check performed. Discussed plan of care.  Assessed patient's immediate needs.  Pt denies any needs at this time.  Safety precautions in place and hourly rounding in place.  Patient resting calmly in bed.

## 2021-06-24 NOTE — ED TRIAGE NOTES
Patient was drinking water.   He was reminded not to eat or drink anything until cleared by an erp at this time.

## 2021-06-24 NOTE — PROGRESS NOTES
Attending Hospitalist today is Dr Koenig starting at 0700. Please call this Physician for orders, updates and questions.

## 2021-06-24 NOTE — ED PROVIDER NOTES
"ED Provider Note    CHIEF COMPLAINT  Chief Complaint   Patient presents with   • Vomiting     Pt complains of mid ab pain, vomiting, and poor oral intake x 5-6 days. Normally daily drinker, last drink yesterday morning    • Blood in Urine     Starting 2 days ago        HPI  Scot Ramírez is a 31 y.o. male who presents with a chief complaint of diffuse abdominal pain, vomiting, hematemesis, and hematuria.  The abdominal pain and vomiting started approximately 7 days ago.  He notes he has been unable to keep any food or liquid down.  2 days ago he began vomiting ledy bright red blood and started to develop bloody urine.  He has some pain in bilateral flanks.  He denies any diarrhea or constipation, melena or hematochezia.  He has not taken any medication for his symptoms.  He drinks alcohol daily, his last drink was 2 days ago.  He does not have a history of alcohol withdrawal seizures.      REVIEW OF SYSTEMS  See HPI for further details.  Abdominal pain.  Vomiting.  Hematemesis.  Hematuria.  All other systems are negative.     PAST MEDICAL HISTORY   has a past medical history of Asthma and Helicobacter pylori (H. pylori) infection.    SOCIAL HISTORY  Social History     Tobacco Use   • Smoking status: Current Every Day Smoker   • Smokeless tobacco: Never Used   Substance and Sexual Activity   • Alcohol use: Yes     Comment: daily vodka and beer, last drink yesterday morning    • Drug use: Yes     Comment: Cocaine occasionally, marijuana    • Sexual activity: Not on file       SURGICAL HISTORY  patient denies any surgical history    CURRENT MEDICATIONS  Home Medications    **Home medications have not yet been reviewed for this encounter**         ALLERGIES  Allergies   Allergen Reactions   • Morphine Unspecified     Pt sts allergic rxn to morphine is hallucinations.    • Other Drug Swelling     GI Cocktail- \"throat swells\"   • Penicillins Anaphylaxis       PHYSICAL EXAM  VITAL SIGNS: /97   Pulse 84   Temp (!) " "35.6 °C (96 °F) (Temporal)   Resp 16   Ht 1.676 m (5' 6\")   Wt 65.8 kg (145 lb)   SpO2 97%   BMI 23.40 kg/m²    Pulse ox interpretation: I interpret this pulse ox as normal.  Constitutional: Alert in no apparent distress.  Uncomfortable appearing.  HENT: No signs of trauma, Bilateral external ears normal, Nose normal.  Very dry mucous membranes.  Dried blood on lips.  Eyes: Pupils are equal and reactive, Conjunctiva normal, scleral icterus.   Neck: Normal range of motion, No tenderness, Supple, No stridor.   Lymphatic: No lymphadenopathy noted.   Cardiovascular: Regular rate and rhythm, no murmurs. Pulses symmetrical.  Thorax & Lungs: Normal breath sounds, No respiratory distress, No wheezing, No chest tenderness.   Abdomen: Bowel sounds normal, Soft, diffuse tenderness, No masses, No pulsatile masses. No peritoneal signs.  Skin: Warm, Dry, No erythema, No rash.  Jaundice.  Back: No bony tenderness, no CVA tenderness.   Extremities: Intact distal pulses, No edema, No tenderness, No cyanosis.  Musculoskeletal: Good range of motion in all major joints. No tenderness to palpation or major deformities noted.   Neurologic: Alert, Normal motor function, Normal sensory function, No focal deficits noted.   Psychiatric: Affect normal, Judgment normal, Mood normal.     DIAGNOSTIC STUDIES / PROCEDURES    LABS  Results for orders placed or performed during the hospital encounter of 06/24/21   CBC WITH DIFFERENTIAL   Result Value Ref Range    WBC 10.6 4.8 - 10.8 K/uL    RBC 5.00 4.70 - 6.10 M/uL    Hemoglobin 17.7 14.0 - 18.0 g/dL    Hematocrit 48.5 42.0 - 52.0 %    MCV 97.0 81.4 - 97.8 fL    MCH 35.4 (H) 27.0 - 33.0 pg    MCHC 36.5 (H) 33.7 - 35.3 g/dL    RDW 46.8 35.9 - 50.0 fL    Platelet Count 112 (L) 164 - 446 K/uL    MPV 11.3 9.0 - 12.9 fL    Neutrophils-Polys 88.10 (H) 44.00 - 72.00 %    Lymphocytes 4.50 (L) 22.00 - 41.00 %    Monocytes 4.90 0.00 - 13.40 %    Eosinophils 1.20 0.00 - 6.90 %    Basophils 0.70 0.00 - " 1.80 %    Immature Granulocytes 0.60 0.00 - 0.90 %    Nucleated RBC 0.00 /100 WBC    Neutrophils (Absolute) 9.31 (H) 1.82 - 7.42 K/uL    Lymphs (Absolute) 0.48 (L) 1.00 - 4.80 K/uL    Monos (Absolute) 0.52 0.00 - 0.85 K/uL    Eos (Absolute) 0.13 0.00 - 0.51 K/uL    Baso (Absolute) 0.07 0.00 - 0.12 K/uL    Immature Granulocytes (abs) 0.06 0.00 - 0.11 K/uL    NRBC (Absolute) 0.00 K/uL   COMP METABOLIC PANEL   Result Value Ref Range    Sodium 134 (L) 135 - 145 mmol/L    Potassium 4.9 3.6 - 5.5 mmol/L    Chloride 87 (L) 96 - 112 mmol/L    Co2 17 (L) 20 - 33 mmol/L    Anion Gap 30.0 (H) 7.0 - 16.0    Glucose 102 (H) 65 - 99 mg/dL    Bun 9 8 - 22 mg/dL    Creatinine 0.56 0.50 - 1.40 mg/dL    Calcium 10.1 8.5 - 10.5 mg/dL    AST(SGOT) 220 (H) 12 - 45 U/L    ALT(SGPT) 80 (H) 2 - 50 U/L    Alkaline Phosphatase 131 (H) 30 - 99 U/L    Total Bilirubin 7.2 (H) 0.1 - 1.5 mg/dL    Albumin 4.7 3.2 - 4.9 g/dL    Total Protein 8.6 (H) 6.0 - 8.2 g/dL    Globulin 3.9 (H) 1.9 - 3.5 g/dL    A-G Ratio 1.2 g/dL   LIPASE   Result Value Ref Range    Lipase 214 (H) 11 - 82 U/L   URINALYSIS    Specimen: Urine   Result Value Ref Range    Color Alesia     Character Cloudy (A)     Specific Gravity 1.036 <1.035    Ph 6.5 5.0 - 8.0    Glucose Negative Negative mg/dL    Ketones >=160 Negative mg/dL    Protein 100 (A) Negative mg/dL    Bilirubin Large (A) Negative    Urobilinogen, Urine 1.0 Negative    Nitrite Negative Negative    Leukocyte Esterase Small (A) Negative    Occult Blood Negative Negative    Micro Urine Req Microscopic    ESTIMATED GFR   Result Value Ref Range    GFR If African American >60 >60 mL/min/1.73 m 2    GFR If Non African American >60 >60 mL/min/1.73 m 2   URINE MICROSCOPIC (W/UA)   Result Value Ref Range    WBC 2-5 (A) /hpf    RBC 0-2 (A) /hpf    Bacteria Few (A) None /hpf    Epithelial Cells Few /hpf    Mucous Threads Many /hpf    Hyaline Cast 3-5 (A) /lpf     RADIOLOGY  RUQ US  CT abdomen/pelvis    COURSE & MEDICAL DECISION  MAKING  Pertinent Labs & Imaging studies reviewed. (See chart for details)  This is a 31-year-old male with a history of alcohol abuse who is here with abdominal pain, nausea, vomiting, hematuria.  Arrives hypothermic but afebrile with otherwise normal vital signs.  He appears very dehydrated with dry mucous membranes and does have some specks of dried blood on his lips.  I am concerned for esophageal varices VS alcoholic gastritis.  No chest pain or chest wall crepitus to suggest Boerhaave syndrome.  His abdomen is soft but diffusely tender without peritoneal signs.  He does have scleral icterus with diffuse jaundice.    Patient was started on IV fluids, Protonix bolus and drip, pain and nausea medication.    CBC suggests possible hemoconcentration with no anemia which is reassuring.  His sodium is slightly low at 134, chloride of 87, CO2 of 17, anion gap of 30, blood glucose of 102.  Transaminitis is consistent with alcohol use with an AST of 220, ALT of 80, alk phos of 131, and total bilirubin 7.2.  Lipase is elevated to 214.  An ultrasound of the right upper quadrant was performed to rule out choledocholithiasis and evaluate liver contour.  This revealed echogenic liver most commonly associated with hepatic steatosis.  Given the diffuse abdominal tenderness and intractable nausea/vomiting a CT of the abdomen/pelvis was performed that revealed diffuse wall thickening throughout the entire colon potentially relating to pancolitis.    Patient did require multiple doses of antiemetics.    He will require hospitalization for additional work-up and management.    Patient was discussed with the hospitalist and he was admitted in guarded condition.    HYDRATION  HYDRATION: Based on the patient's presentation of Acute Vomiting and Dehydration the patient was given IV fluids. IV Hydration was used because oral hydration was not adequate alone. Upon recheck following hydration, the patient was Improved.    FINAL  IMPRESSION  1.  Pancreatitis  2.  Transaminitis  3.  Hepatic steatosis    Electronically signed by: Bob Lazaro M.D., 6/24/2021 1:32 AM

## 2021-06-24 NOTE — ASSESSMENT & PLAN NOTE
Patient with significant alcohol abuse  Patient reports he drinks anywhere from a few shots and a couple beers a day to a pint of vodka a day.  Patient is unsure as he loses track.  Last drink was almost 2 days prior to arrival  Patient does report going through withdrawals while he was in [place], he reports just drinking more alcohol to take over the symptoms.  Patient denies any prior history of seizures from alcohol use.  - Alcohol level, Utox  - CIWA protocol  - Multivitamin and RALLY Bag  - Thiamine 100mg qd x4 doses and Folic Acid 1mg qd x4 doses  - Seizure/Fall precautions  - Alcohol cessation counseling

## 2021-06-24 NOTE — ED TRIAGE NOTES
Chief Complaint   Patient presents with   • Vomiting     Pt complains of mid ab pain, vomiting, and poor oral intake x 5-6 days. Normally daily drinker, last drink yesterday morning    • Blood in Urine     Starting 2 days ago        Vitals:    06/23/21 2134   BP: 132/99   Pulse: (!) 109   Resp: 17   Temp: (!) 35.6 °C (96 °F)   SpO2: 94%

## 2021-06-24 NOTE — ED NOTES
"Med Rec completed: per patient at bedside  Preferred Pharmacy: [facility] in [place]   Allergies:  Allergies   Allergen Reactions   • Morphine Unspecified     Pt sts allergic rxn to morphine is hallucinations.    • Other Drug Swelling     GI Cocktail- \"throat swells\"   • Penicillins Hives       No ORAL antibiotics in last 14 days    Home Medications: No known home medications. Denies any prescription or OTC medications.       "

## 2021-06-24 NOTE — PROGRESS NOTES
4 Eyes Skin Assessment Completed by ISAAK Allred and ISAAK Frias.    Head WDL  Ears WDL  Nose WDL  Mouth WDL  Neck WDL  Breast/Chest WDL  Shoulder Blades WDL  Spine WDL  (R) Arm/Elbow/Hand WDL  (L) Arm/Elbow/Hand WDL  Abdomen WDL  Groin WDL  Scrotum/Coccyx/Buttocks WDL  (R) Leg WDL  (L) Leg Scab, on knee, healing  (R) Heel/Foot/Toe WDL  (L) Heel/Foot/Toe WDL          Devices In Places SCD's      Interventions In Place N/A

## 2021-06-24 NOTE — ASSESSMENT & PLAN NOTE
Anuj's discriminant function is 17.3.  His MELD sodium is 20 indicating a 7-10% 90-day mortality rate  Will not start steroids as Maddrey score 17.3 per GI  GI following  Patient understands that he needs to quit drinking otherwise he will likely have a premature death due to liver failure.

## 2021-06-25 ENCOUNTER — ANESTHESIA EVENT (OUTPATIENT)
Dept: SURGERY | Facility: MEDICAL CENTER | Age: 31
DRG: 377 | End: 2021-06-25
Payer: COMMERCIAL

## 2021-06-25 ENCOUNTER — ANESTHESIA (OUTPATIENT)
Dept: SURGERY | Facility: MEDICAL CENTER | Age: 31
DRG: 377 | End: 2021-06-25
Payer: COMMERCIAL

## 2021-06-25 PROBLEM — K85.90 PANCREATITIS: Status: RESOLVED | Noted: 2021-06-24 | Resolved: 2021-06-25

## 2021-06-25 PROBLEM — R74.8 ELEVATED LIPASE: Status: ACTIVE | Noted: 2021-06-25

## 2021-06-25 LAB
ALBUMIN SERPL BCP-MCNC: 3.6 G/DL (ref 3.2–4.9)
ALBUMIN/GLOB SERPL: 1.3 G/DL
ALP SERPL-CCNC: 85 U/L (ref 30–99)
ALT SERPL-CCNC: 42 U/L (ref 2–50)
ANION GAP SERPL CALC-SCNC: 9 MMOL/L (ref 7–16)
AST SERPL-CCNC: 109 U/L (ref 12–45)
BASOPHILS # BLD AUTO: 0.6 % (ref 0–1.8)
BASOPHILS # BLD: 0.03 K/UL (ref 0–0.12)
BILIRUB SERPL-MCNC: 5.8 MG/DL (ref 0.1–1.5)
BUN SERPL-MCNC: 5 MG/DL (ref 8–22)
CALCIUM SERPL-MCNC: 9.2 MG/DL (ref 8.5–10.5)
CHLORIDE SERPL-SCNC: 96 MMOL/L (ref 96–112)
CO2 SERPL-SCNC: 30 MMOL/L (ref 20–33)
CREAT SERPL-MCNC: 0.55 MG/DL (ref 0.5–1.4)
EOSINOPHIL # BLD AUTO: 0.2 K/UL (ref 0–0.51)
EOSINOPHIL NFR BLD: 4.1 % (ref 0–6.9)
ERYTHROCYTE [DISTWIDTH] IN BLOOD BY AUTOMATED COUNT: 51.8 FL (ref 35.9–50)
GLOBULIN SER CALC-MCNC: 2.7 G/DL (ref 1.9–3.5)
GLUCOSE SERPL-MCNC: 84 MG/DL (ref 65–99)
HCT VFR BLD AUTO: 40.2 % (ref 42–52)
HGB BLD-MCNC: 13.9 G/DL (ref 14–18)
HGB BLD-MCNC: 13.9 G/DL (ref 14–18)
IMM GRANULOCYTES # BLD AUTO: 0.02 K/UL (ref 0–0.11)
IMM GRANULOCYTES NFR BLD AUTO: 0.4 % (ref 0–0.9)
LYMPHOCYTES # BLD AUTO: 1.39 K/UL (ref 1–4.8)
LYMPHOCYTES NFR BLD: 28.8 % (ref 22–41)
MAGNESIUM SERPL-MCNC: 1.5 MG/DL (ref 1.5–2.5)
MCH RBC QN AUTO: 35.5 PG (ref 27–33)
MCHC RBC AUTO-ENTMCNC: 34.6 G/DL (ref 33.7–35.3)
MCV RBC AUTO: 102.8 FL (ref 81.4–97.8)
MONOCYTES # BLD AUTO: 0.33 K/UL (ref 0–0.85)
MONOCYTES NFR BLD AUTO: 6.8 % (ref 0–13.4)
NEUTROPHILS # BLD AUTO: 2.85 K/UL (ref 1.82–7.42)
NEUTROPHILS NFR BLD: 59.3 % (ref 44–72)
NRBC # BLD AUTO: 0 K/UL
NRBC BLD-RTO: 0 /100 WBC
PATHOLOGY CONSULT NOTE: NORMAL
PHOSPHATE SERPL-MCNC: 1.9 MG/DL (ref 2.5–4.5)
PLATELET # BLD AUTO: 48 K/UL (ref 164–446)
PMV BLD AUTO: 11.2 FL (ref 9–12.9)
POTASSIUM SERPL-SCNC: 3.6 MMOL/L (ref 3.6–5.5)
PROT SERPL-MCNC: 6.3 G/DL (ref 6–8.2)
RBC # BLD AUTO: 3.91 M/UL (ref 4.7–6.1)
SODIUM SERPL-SCNC: 135 MMOL/L (ref 135–145)
WBC # BLD AUTO: 4.8 K/UL (ref 4.8–10.8)

## 2021-06-25 PROCEDURE — C1726 CATH, BAL DIL, NON-VASCULAR: HCPCS | Performed by: INTERNAL MEDICINE

## 2021-06-25 PROCEDURE — 99233 SBSQ HOSP IP/OBS HIGH 50: CPT | Performed by: STUDENT IN AN ORGANIZED HEALTH CARE EDUCATION/TRAINING PROGRAM

## 2021-06-25 PROCEDURE — 88312 SPECIAL STAINS GROUP 1: CPT

## 2021-06-25 PROCEDURE — 160035 HCHG PACU - 1ST 60 MINS PHASE I: Performed by: INTERNAL MEDICINE

## 2021-06-25 PROCEDURE — 160203 HCHG ENDO MINUTES - 1ST 30 MINS LEVEL 4: Performed by: INTERNAL MEDICINE

## 2021-06-25 PROCEDURE — 700105 HCHG RX REV CODE 258: Performed by: STUDENT IN AN ORGANIZED HEALTH CARE EDUCATION/TRAINING PROGRAM

## 2021-06-25 PROCEDURE — 85025 COMPLETE CBC W/AUTO DIFF WBC: CPT

## 2021-06-25 PROCEDURE — 0D718ZZ DILATION OF UPPER ESOPHAGUS, VIA NATURAL OR ARTIFICIAL OPENING ENDOSCOPIC: ICD-10-PCS | Performed by: INTERNAL MEDICINE

## 2021-06-25 PROCEDURE — 700105 HCHG RX REV CODE 258: Performed by: ANESTHESIOLOGY

## 2021-06-25 PROCEDURE — 0DB48ZX EXCISION OF ESOPHAGOGASTRIC JUNCTION, VIA NATURAL OR ARTIFICIAL OPENING ENDOSCOPIC, DIAGNOSTIC: ICD-10-PCS | Performed by: INTERNAL MEDICINE

## 2021-06-25 PROCEDURE — 160002 HCHG RECOVERY MINUTES (STAT): Performed by: INTERNAL MEDICINE

## 2021-06-25 PROCEDURE — 80053 COMPREHEN METABOLIC PANEL: CPT

## 2021-06-25 PROCEDURE — C9113 INJ PANTOPRAZOLE SODIUM, VIA: HCPCS | Performed by: STUDENT IN AN ORGANIZED HEALTH CARE EDUCATION/TRAINING PROGRAM

## 2021-06-25 PROCEDURE — A9270 NON-COVERED ITEM OR SERVICE: HCPCS | Performed by: STUDENT IN AN ORGANIZED HEALTH CARE EDUCATION/TRAINING PROGRAM

## 2021-06-25 PROCEDURE — 84100 ASSAY OF PHOSPHORUS: CPT

## 2021-06-25 PROCEDURE — 83735 ASSAY OF MAGNESIUM: CPT

## 2021-06-25 PROCEDURE — 160208 HCHG ENDO MINUTES - EA ADDL 1 MIN LEVEL 4: Performed by: INTERNAL MEDICINE

## 2021-06-25 PROCEDURE — 700102 HCHG RX REV CODE 250 W/ 637 OVERRIDE(OP): Performed by: STUDENT IN AN ORGANIZED HEALTH CARE EDUCATION/TRAINING PROGRAM

## 2021-06-25 PROCEDURE — 160009 HCHG ANES TIME/MIN: Performed by: INTERNAL MEDICINE

## 2021-06-25 PROCEDURE — 85018 HEMOGLOBIN: CPT

## 2021-06-25 PROCEDURE — 36415 COLL VENOUS BLD VENIPUNCTURE: CPT

## 2021-06-25 PROCEDURE — 700111 HCHG RX REV CODE 636 W/ 250 OVERRIDE (IP): Performed by: STUDENT IN AN ORGANIZED HEALTH CARE EDUCATION/TRAINING PROGRAM

## 2021-06-25 PROCEDURE — 160048 HCHG OR STATISTICAL LEVEL 1-5: Performed by: INTERNAL MEDICINE

## 2021-06-25 PROCEDURE — 88305 TISSUE EXAM BY PATHOLOGIST: CPT

## 2021-06-25 PROCEDURE — 0DB78ZX EXCISION OF STOMACH, PYLORUS, VIA NATURAL OR ARTIFICIAL OPENING ENDOSCOPIC, DIAGNOSTIC: ICD-10-PCS | Performed by: INTERNAL MEDICINE

## 2021-06-25 PROCEDURE — 503084 HCHG FORCEPS (ENDO): Performed by: INTERNAL MEDICINE

## 2021-06-25 PROCEDURE — 770006 HCHG ROOM/CARE - MED/SURG/GYN SEMI*

## 2021-06-25 PROCEDURE — 700111 HCHG RX REV CODE 636 W/ 250 OVERRIDE (IP): Performed by: ANESTHESIOLOGY

## 2021-06-25 RX ORDER — DIPHENHYDRAMINE HYDROCHLORIDE 50 MG/ML
12.5 INJECTION INTRAMUSCULAR; INTRAVENOUS
Status: DISCONTINUED | OUTPATIENT
Start: 2021-06-25 | End: 2021-06-25 | Stop reason: HOSPADM

## 2021-06-25 RX ORDER — SODIUM CHLORIDE, SODIUM LACTATE, POTASSIUM CHLORIDE, CALCIUM CHLORIDE 600; 310; 30; 20 MG/100ML; MG/100ML; MG/100ML; MG/100ML
INJECTION, SOLUTION INTRAVENOUS
Status: DISCONTINUED | OUTPATIENT
Start: 2021-06-25 | End: 2021-06-25 | Stop reason: SURG

## 2021-06-25 RX ORDER — MIDAZOLAM HYDROCHLORIDE 1 MG/ML
1 INJECTION INTRAMUSCULAR; INTRAVENOUS
Status: DISCONTINUED | OUTPATIENT
Start: 2021-06-25 | End: 2021-06-25 | Stop reason: HOSPADM

## 2021-06-25 RX ORDER — ONDANSETRON 2 MG/ML
4 INJECTION INTRAMUSCULAR; INTRAVENOUS
Status: DISCONTINUED | OUTPATIENT
Start: 2021-06-25 | End: 2021-06-25 | Stop reason: HOSPADM

## 2021-06-25 RX ORDER — SODIUM CHLORIDE, SODIUM LACTATE, POTASSIUM CHLORIDE, CALCIUM CHLORIDE 600; 310; 30; 20 MG/100ML; MG/100ML; MG/100ML; MG/100ML
INJECTION, SOLUTION INTRAVENOUS CONTINUOUS
Status: DISCONTINUED | OUTPATIENT
Start: 2021-06-25 | End: 2021-06-25 | Stop reason: HOSPADM

## 2021-06-25 RX ORDER — MIDAZOLAM HYDROCHLORIDE 1 MG/ML
INJECTION INTRAMUSCULAR; INTRAVENOUS PRN
Status: DISCONTINUED | OUTPATIENT
Start: 2021-06-25 | End: 2021-06-25 | Stop reason: SURG

## 2021-06-25 RX ORDER — MEPERIDINE HYDROCHLORIDE 25 MG/ML
6.25 INJECTION INTRAMUSCULAR; INTRAVENOUS; SUBCUTANEOUS
Status: DISCONTINUED | OUTPATIENT
Start: 2021-06-25 | End: 2021-06-25 | Stop reason: HOSPADM

## 2021-06-25 RX ORDER — HALOPERIDOL 5 MG/ML
1 INJECTION INTRAMUSCULAR
Status: DISCONTINUED | OUTPATIENT
Start: 2021-06-25 | End: 2021-06-25 | Stop reason: HOSPADM

## 2021-06-25 RX ADMIN — PROCHLORPERAZINE EDISYLATE 10 MG: 5 INJECTION INTRAMUSCULAR; INTRAVENOUS at 01:53

## 2021-06-25 RX ADMIN — PROPOFOL 20 MG: 10 INJECTION, EMULSION INTRAVENOUS at 10:36

## 2021-06-25 RX ADMIN — HYDROMORPHONE HYDROCHLORIDE 0.5 MG: 1 INJECTION, SOLUTION INTRAMUSCULAR; INTRAVENOUS; SUBCUTANEOUS at 12:15

## 2021-06-25 RX ADMIN — PROPOFOL 30 MG: 10 INJECTION, EMULSION INTRAVENOUS at 10:41

## 2021-06-25 RX ADMIN — HYDROMORPHONE HYDROCHLORIDE 0.5 MG: 1 INJECTION, SOLUTION INTRAMUSCULAR; INTRAVENOUS; SUBCUTANEOUS at 19:25

## 2021-06-25 RX ADMIN — ONDANSETRON 4 MG: 2 INJECTION INTRAMUSCULAR; INTRAVENOUS at 05:46

## 2021-06-25 RX ADMIN — PROCHLORPERAZINE EDISYLATE 10 MG: 5 INJECTION INTRAMUSCULAR; INTRAVENOUS at 09:04

## 2021-06-25 RX ADMIN — PROPOFOL 20 MG: 10 INJECTION, EMULSION INTRAVENOUS at 10:17

## 2021-06-25 RX ADMIN — HYDROMORPHONE HYDROCHLORIDE 0.5 MG: 1 INJECTION, SOLUTION INTRAMUSCULAR; INTRAVENOUS; SUBCUTANEOUS at 05:46

## 2021-06-25 RX ADMIN — PROPOFOL 20 MG: 10 INJECTION, EMULSION INTRAVENOUS at 10:13

## 2021-06-25 RX ADMIN — MIDAZOLAM HYDROCHLORIDE 4 MG: 1 INJECTION, SOLUTION INTRAMUSCULAR; INTRAVENOUS at 10:02

## 2021-06-25 RX ADMIN — DIBASIC SODIUM PHOSPHATE, MONOBASIC POTASSIUM PHOSPHATE AND MONOBASIC SODIUM PHOSPHATE 250 MG: 852; 155; 130 TABLET ORAL at 17:55

## 2021-06-25 RX ADMIN — PROPOFOL 20 MG: 10 INJECTION, EMULSION INTRAVENOUS at 10:28

## 2021-06-25 RX ADMIN — FENTANYL CITRATE 50 MCG: 50 INJECTION, SOLUTION INTRAMUSCULAR; INTRAVENOUS at 10:09

## 2021-06-25 RX ADMIN — HYDROMORPHONE HYDROCHLORIDE 0.5 MG: 1 INJECTION, SOLUTION INTRAMUSCULAR; INTRAVENOUS; SUBCUTANEOUS at 09:03

## 2021-06-25 RX ADMIN — SODIUM CHLORIDE: 9 INJECTION, SOLUTION INTRAVENOUS at 03:30

## 2021-06-25 RX ADMIN — HYDROMORPHONE HYDROCHLORIDE 0.5 MG: 1 INJECTION, SOLUTION INTRAMUSCULAR; INTRAVENOUS; SUBCUTANEOUS at 01:53

## 2021-06-25 RX ADMIN — PROPOFOL 20 MG: 10 INJECTION, EMULSION INTRAVENOUS at 10:22

## 2021-06-25 RX ADMIN — SODIUM CHLORIDE, POTASSIUM CHLORIDE, SODIUM LACTATE AND CALCIUM CHLORIDE: 600; 310; 30; 20 INJECTION, SOLUTION INTRAVENOUS at 10:04

## 2021-06-25 RX ADMIN — PANTOPRAZOLE SODIUM 40 MG: 40 INJECTION, POWDER, LYOPHILIZED, FOR SOLUTION INTRAVENOUS at 17:58

## 2021-06-25 RX ADMIN — HYDROMORPHONE HYDROCHLORIDE 0.5 MG: 1 INJECTION, SOLUTION INTRAMUSCULAR; INTRAVENOUS; SUBCUTANEOUS at 16:04

## 2021-06-25 RX ADMIN — HYDROMORPHONE HYDROCHLORIDE 0.5 MG: 1 INJECTION, SOLUTION INTRAMUSCULAR; INTRAVENOUS; SUBCUTANEOUS at 23:06

## 2021-06-25 RX ADMIN — PANTOPRAZOLE SODIUM 40 MG: 40 INJECTION, POWDER, LYOPHILIZED, FOR SOLUTION INTRAVENOUS at 05:42

## 2021-06-25 RX ADMIN — PROPOFOL 50 MG: 10 INJECTION, EMULSION INTRAVENOUS at 10:10

## 2021-06-25 RX ADMIN — SODIUM CHLORIDE: 9 INJECTION, SOLUTION INTRAVENOUS at 16:03

## 2021-06-25 RX ADMIN — PROPOFOL 20 MG: 10 INJECTION, EMULSION INTRAVENOUS at 10:32

## 2021-06-25 ASSESSMENT — LIFESTYLE VARIABLES
PAROXYSMAL SWEATS: NO SWEAT VISIBLE
NAUSEA AND VOMITING: MILD NAUSEA WITH NO VOMITING
TOTAL SCORE: 0
TREMOR: NO TREMOR
AGITATION: NORMAL ACTIVITY
ORIENTATION AND CLOUDING OF SENSORIUM: ORIENTED AND CAN DO SERIAL ADDITIONS
AGITATION: NORMAL ACTIVITY
PAROXYSMAL SWEATS: NO SWEAT VISIBLE
AUDITORY DISTURBANCES: NOT PRESENT
TOTAL SCORE: 1
TOTAL SCORE: 4
ANXIETY: NO ANXIETY (AT EASE)
HEADACHE, FULLNESS IN HEAD: NOT PRESENT
ORIENTATION AND CLOUDING OF SENSORIUM: ORIENTED AND CAN DO SERIAL ADDITIONS
TREMOR: NO TREMOR
ORIENTATION AND CLOUDING OF SENSORIUM: ORIENTED AND CAN DO SERIAL ADDITIONS
TREMOR: NO TREMOR
TREMOR: NO TREMOR
VISUAL DISTURBANCES: NOT PRESENT
TOTAL SCORE: 0
AUDITORY DISTURBANCES: NOT PRESENT
TOTAL SCORE: 1
NAUSEA AND VOMITING: *
HEADACHE, FULLNESS IN HEAD: NOT PRESENT
AUDITORY DISTURBANCES: NOT PRESENT
ORIENTATION AND CLOUDING OF SENSORIUM: ORIENTED AND CAN DO SERIAL ADDITIONS
HEADACHE, FULLNESS IN HEAD: NOT PRESENT
PAROXYSMAL SWEATS: NO SWEAT VISIBLE
VISUAL DISTURBANCES: NOT PRESENT
ANXIETY: NO ANXIETY (AT EASE)
VISUAL DISTURBANCES: NOT PRESENT
PAROXYSMAL SWEATS: NO SWEAT VISIBLE
NAUSEA AND VOMITING: NO NAUSEA AND NO VOMITING
ANXIETY: NO ANXIETY (AT EASE)
AGITATION: NORMAL ACTIVITY
TREMOR: NO TREMOR
AUDITORY DISTURBANCES: NOT PRESENT
PAROXYSMAL SWEATS: NO SWEAT VISIBLE
TREMOR: NO TREMOR
NAUSEA AND VOMITING: MILD NAUSEA WITH NO VOMITING
ANXIETY: MILDLY ANXIOUS
HEADACHE, FULLNESS IN HEAD: NOT PRESENT
NAUSEA AND VOMITING: NO NAUSEA AND NO VOMITING
HEADACHE, FULLNESS IN HEAD: NOT PRESENT
VISUAL DISTURBANCES: NOT PRESENT
AUDITORY DISTURBANCES: NOT PRESENT
VISUAL DISTURBANCES: NOT PRESENT
HEADACHE, FULLNESS IN HEAD: NOT PRESENT
ANXIETY: NO ANXIETY (AT EASE)
NAUSEA AND VOMITING: NO NAUSEA AND NO VOMITING
ANXIETY: NO ANXIETY (AT EASE)
AUDITORY DISTURBANCES: NOT PRESENT
VISUAL DISTURBANCES: NOT PRESENT
PAROXYSMAL SWEATS: NO SWEAT VISIBLE
TOTAL SCORE: 0

## 2021-06-25 ASSESSMENT — PAIN DESCRIPTION - PAIN TYPE
TYPE: ACUTE PAIN

## 2021-06-25 ASSESSMENT — ENCOUNTER SYMPTOMS
CHILLS: 0
VOMITING: 1
ABDOMINAL PAIN: 1
SHORTNESS OF BREATH: 0
FEVER: 0
NAUSEA: 1
PALPITATIONS: 0

## 2021-06-25 ASSESSMENT — PATIENT HEALTH QUESTIONNAIRE - PHQ9
2. FEELING DOWN, DEPRESSED, IRRITABLE, OR HOPELESS: NOT AT ALL
SUM OF ALL RESPONSES TO PHQ9 QUESTIONS 1 AND 2: 0
1. LITTLE INTEREST OR PLEASURE IN DOING THINGS: NOT AT ALL

## 2021-06-25 NOTE — ANESTHESIA TIME REPORT
Anesthesia Start and Stop Event Times     Date Time Event    6/25/2021 0940 Ready for Procedure     1004 Anesthesia Start     1057 Anesthesia Stop        Responsible Staff  06/25/21    Name Role Begin End    Tiffanie Drake M.D. Anesth 1004 1057        Preop Diagnosis (Free Text):  Pre-op Diagnosis     Hematemesis, abd pain        Preop Diagnosis (Codes):    Post op Diagnosis  Hematemesis      Premium Reason  Non-Premium    Comments:

## 2021-06-25 NOTE — CARE PLAN
The patient is Stable - Low risk of patient condition declining or worsening    Shift Goals  Clinical Goals: Less pain and vomiting  Patient Goals: sleep comfortably    Progress made toward(s) clinical / shift goals:  Due med for pain and n/v given as ordered. Will continue to monitor.    Patient is not progressing towards the following goals:

## 2021-06-25 NOTE — CARE PLAN
Problem: Pain - Standard  Goal: Alleviation of pain or a reduction in pain to the patient’s comfort goal  Outcome: Progressing  Flowsheets (Taken 6/25/2021 7984)  Non Verbal Scale:   Calm   Unlabored Breathing   Sleeping     Problem: Optimal Care for Alcohol Withdrawal  Goal: Optimal Care for the alcohol withdrawal patient  Outcome: Progressing  Note: Pt CIWA <4   The patient is Stable - Low risk of patient condition declining or worsening    Shift Goals  Clinical Goals: control pain and nausea  Patient Goals: rest    Progress made toward(s) clinical / shift goals:  Pt given pain/nausea medications per protocol, tolerating clear liquids, resting in bed with eyes closed.    Patient is not progressing towards the following goals:

## 2021-06-25 NOTE — ANESTHESIA POSTPROCEDURE EVALUATION
Patient: Scot Ramírez    Procedure Summary     Date: 06/25/21 Room / Location: [facility] ROOM 26 / SURGERY SAME DAY [facility]    Anesthesia Start: 1004 Anesthesia Stop: 1057    Procedures:       GASTROSCOPY (N/A Esophagus)      GASTROSCOPY, WITH BIOPSY (N/A Esophagus)      GASTROSCOPY, WITH BALLOON DILATION (N/A Esophagus) Diagnosis: (GE Junction Ulcerations, Proximal Esophageal Stricture, Esophagitis, gastritis)    Surgeons: Jeronimo Wallace M.D. Responsible Provider: Tiffanie Drake M.D.    Anesthesia Type: MAC ASA Status: 3          Final Anesthesia Type: MAC  Last vitals  BP   Blood Pressure: 122/83    Temp   36.3 °C (97.3 °F)    Pulse   80   Resp   16    SpO2   96 %      Anesthesia Post Evaluation    Patient location during evaluation: PACU  Patient participation: complete - patient participated  Level of consciousness: awake and alert    Airway patency: patent  Anesthetic complications: no  Cardiovascular status: hemodynamically stable  Respiratory status: acceptable  Hydration status: euvolemic    PONV: none          No complications documented.     Nurse Pain Score: 8 (NPRS)

## 2021-06-25 NOTE — PROGRESS NOTES
Daiana from Lab called with critical result of Platelet 48 at 0442. Critical lab result read back to Autmn.   Dr. Green notified of critical lab platelet 48 result at 0527.  Critical lab result read back by Dr. Green.

## 2021-06-25 NOTE — PROGRESS NOTES
[facility] Medicine Daily Progress Note    Date of Service  6/25/2021    Chief Complaint  31 y.o. male admitted 6/24/2021 with hematemesis    Hospital Course  31 y.o. male with PMH of asthma and alcohol abuse.  Complains of diffuse abdominal pain, who presented 6/24/2021 with nausea, vomiting, hematuria, and hematemesis.  Patient reports that he has history of heavy alcohol use, and history of pancreatitis in the past.  Patient reports that he is again having diffuse abdominal pain for the past 7 days.  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  He reports that during these hematemesis events he thinks he produces about half a cup of blood each time.  He denies any prior history of GI bleeding in the past or bleeding tendencies.  He additionally states that because of his nausea and vomiting he has not been able to keep any food or liquid down since his symptoms started.  Additionally, about 2 days ago he noticed that his eyes looked little bit more yellow than normal and he additionally was having ledy bright red blood in his urine.  He additionally endorses some bilateral flank pain.  He denies any diarrhea, constipation, melena, or hematochezia.  He did not not take any medications for symptoms.  Of note, patient reports that he is a heavy drinker and he loses track of how much he drinks.  He reports it varies anywhere from a few beers and shots a day to a pint of hard alcohol.  He reports his last drink was about 2 days ago.  He does admit to having an incidence of withdrawal symptoms in the past while he was in [place].  He reports drinking light beer to help take the edge off his symptoms.  He denies having any prior history of seizures from alcohol withdrawal or use in the past.     ED: Temp 96, HR 10 9-57.  Platelets 112, , CL 87, CO2 17, glucose 102, , ALT 80, alk phos 131, total bilirubin 7.2, lipase 214.  Ultrasound right upper  quadrant was performed with findings of hepatic steatosis.  CT abdomen also performed with diffuse colonic wall thickening, as well is against findings of hepatic steatosis.  Patient was given Protonix 40 mg, Dilaudid 0.5 mg x 2, LR 1 L x 2, Ativan 0.5 mg, and Zofran 4 mg.    Interval Problem Update    6/25: seen and examined the patient after EGD, report sore throat, mild abd pain. EGD shows Tight Stricture / web at 19 cm form incisors very proximal. Dilated from 6 to 10 mm. Broad superficial tearing at site of stricture after dilation with self-limited oozing. Severe LA grade D ulcerative / erosive esophagitis biopsied at ulcer edges. Stomach:  Sever patchy hemorrhagic gastritis (biopsies taken for H.pylori at body antrum junction). GI rec PPI bid, cbc, iron studies, follow up with GI outpatient in 4 weeks. Suspecting catherine Francisco J syndrome      Consultants/Specialty  GI Dr Jackson    Code Status  Full Code    Disposition  Dc home in 1-2 days    Review of Systems  Review of Systems   Constitutional: Negative for chills and fever.   Respiratory: Negative for shortness of breath.    Cardiovascular: Negative for chest pain and palpitations.   Gastrointestinal: Positive for abdominal pain, nausea and vomiting.   All other systems reviewed and are negative.       Physical Exam  Temp:  [36 °C (96.8 °F)-36.8 °C (98.3 °F)] 36.3 °C (97.4 °F)  Pulse:  [] 88  Resp:  [16-18] 18  BP: (113-140)/(72-96) 118/88  SpO2:  [90 %-98 %] 96 %    Physical Exam  Vitals and nursing note reviewed.   Constitutional:       General: He is not in acute distress.     Appearance: He is not ill-appearing.   HENT:      Head: Normocephalic and atraumatic.   Eyes:      General: Scleral icterus present.         Right eye: No discharge.         Left eye: No discharge.   Cardiovascular:      Rate and Rhythm: Normal rate and regular rhythm.   Pulmonary:      Effort: No respiratory distress.      Breath sounds: No wheezing or rales.   Abdominal:       General: There is no distension.      Tenderness: There is no abdominal tenderness. There is no guarding.   Skin:     General: Skin is warm and dry.      Coloration: Skin is jaundiced.   Neurological:      Mental Status: He is alert.         Fluids    Intake/Output Summary (Last 24 hours) at 6/25/2021 1649  Last data filed at 6/25/2021 1045  Gross per 24 hour   Intake 600 ml   Output --   Net 600 ml       Laboratory  Recent Labs     06/23/21  2232 06/24/21  0640 06/24/21  2200 06/25/21  0401 06/25/21  1407   WBC 10.6  --   --  4.8  --    RBC 5.00  --   --  3.91*  --    HEMOGLOBIN 17.7   < > 14.1 13.9* 13.9*   HEMATOCRIT 48.5  --   --  40.2*  --    MCV 97.0  --   --  102.8*  --    MCH 35.4*  --   --  35.5*  --    MCHC 36.5*  --   --  34.6  --    RDW 46.8  --   --  51.8*  --    PLATELETCT 112*  --   --  48*  --    MPV 11.3  --   --  11.2  --     < > = values in this interval not displayed.     Recent Labs     06/23/21 2232 06/25/21  0401   SODIUM 134* 135   POTASSIUM 4.9 3.6   CHLORIDE 87* 96   CO2 17* 30   GLUCOSE 102* 84   BUN 9 5*   CREATININE 0.56 0.55   CALCIUM 10.1 9.2     Recent Labs     06/24/21  0625   APTT 29.7   INR 1.27*         Recent Labs     06/24/21  0625   TRIGLYCERIDE 115   HDL 36*   LDL 75       Imaging  CT-ABDOMEN-PELVIS WITH   Final Result         1. Diffuse wall thickening throughout the entire colon could relate to pancolitis.   2. Hepatic steatosis.      US-RUQ   Final Result      1. Echogenic liver, most commonly hepatic steatosis.              Assessment/Plan  * GI bleed- (present on admission)  Assessment & Plan  S/p EGD 6/25 showing Tight Stricture / web at 19 cm form incisors very proximal. Dilated from 6 to 10 mm. Broad superficial tearing at site of stricture after dilation with self-limited oozing. Severe LA grade D ulcerative / erosive esophagitis biopsied at ulcer edges. Stomach:  Sever patchy hemorrhagic gastritis (biopsies taken for H.pylori at body antrum junction).   GI rec PPI  bid, cbc, iron studies, follow up with GI outpatient in 4 weeks. Suspecting catherine Francisco J syndrome      Elevated lipase  Assessment & Plan  Patient with new onset diffuse abdominal pains, nausea, vomiting  Noted to have elevated lipase of 214, does not meet criteria for acute pancreatitis despite the history of pancreatitis.  The patient does not have lipase over 3 times upper bit of normal or CT findings of pancreatitis, or classic epigastric pain radiating to the back.    On IVF  Clear liquid diet per GI    Pancolitis (HCC)  Assessment & Plan  Pancolitis seen on CT.  GI consulted  GI recommending colonoscopy if he can tolerate the prep.    Hyponatremia  Assessment & Plan  Sodium 134  -Continue with IV fluids  -Monitor    Elevated bilirubin- (present on admission)  Assessment & Plan  secondary to alcoholic hepatitis      Hypophosphatasia  Assessment & Plan  Replace as indicated    Alcoholic hepatitis without ascites- (present on admission)  Assessment & Plan  Dareney's discriminant function is 17.3.  His MELD sodium is 20 indicating a 7-10% 90-day mortality rate  Will not start steroids as Maddrey score 17.3 per GI  GI following  Patient understands that he needs to quit drinking otherwise he will likely have a premature death due to liver failure.    Alcohol abuse- (present on admission)  Assessment & Plan  Patient with significant alcohol abuse  Patient reports he drinks anywhere from a few shots and a couple beers a day to a pint of vodka a day.  Patient is unsure as he loses track.  Last drink was almost 2 days prior to arrival  Patient does report going through withdrawals while he was in [place], he reports just drinking more alcohol to take over the symptoms.  Patient denies any prior history of seizures from alcohol use.  - Alcohol level, Utox  - CIWA protocol  - Multivitamin and RALLY Bag  - Thiamine 100mg qd x4 doses and Folic Acid 1mg qd x4 doses  - Seizure/Fall precautions  - Alcohol cessation  counseling         VTE prophylaxis: SCDs

## 2021-06-25 NOTE — OR NURSING
1055 pt arrived from OR. Report received. Pt on 2L NC. Resting comfortably in [place].     1103 pt tolerating PO fluids. Denies pain and nausea at this time. Weaned to room air.     113 report called to Dionte MULLIGAN on [facility]. Called pt SO Jovana to provide update.     1122 phase 1 recovery complete. Pt awaiting transport.     1130 pt with transport back top room. SO jovana accompanying.

## 2021-06-25 NOTE — OR SURGEON
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    Anesthesiologist/Type of Anesthesia:  Anesthesiologist: Tiffanie Drake M.D./General    Surgical Staff:  Endoscopy Technician: Miriam Alfaro  Endoscopy Nurse: Erendira Epps R.N.    Specimens removed if any:  ID Type Source Tests Collected by Time Destination   A : GE Junction Bx of Ulcerations Tissue Esophagus PATHOLOGY SPECIMEN Jeronimo Wallace M.D. 6/25/2021 10:24 AM    B : Bx to R/O HPylori Tissue Gastric PATHOLOGY SPECIMEN Jeronimo Wallace M.D. 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      6/25/2021 10:57 AM Jeronimo Wallace M.D.

## 2021-06-25 NOTE — PROGRESS NOTES
Assumed cares at 0700, report recieved from Noc RN. Pt A/Ox4, states pain is 7/10; pain meds given and effective. No nausea at this time. Tolerating clear liquids. Bed locked, 2 rails up, bed in lowest position. Call light in place, belongings at bedside, no needs at this time, and hourly rounding in place.

## 2021-06-25 NOTE — OP REPORT
DATE OF SERVICE:  June 25, 2021   INDICATION FOR PROCEDURE:Hematemesis  PROCEDURE PERFORMED:     EGD regular adult upper scope and TTS balloon dilation 6 -7 - 8 and  9 and 10 mm    EGD with pediatric upper scope and biopsies of antrum / body for H. Pylori and of borders of distal esophageal ulcers.     CONSENT:  Informed consent was obtained directly from the patient after   benefits, risks and possible alternatives were discussed.     MEDICATIONS:  The patient received MAC per Dr. Magana       PROCEDURE DESCRIPTION:  The patient was placed in the left lateral decubitus position and provided with supplemental oxygen via nasal cannula.  Vital signs were monitored continuously throughout the procedure.  When ready, the upper endoscope was placed in the patient's mouth and advanced easily and carefully to the posterior pharynx and there was a stenosis and web that could not be traversed. We switched to the pediatric scope and advacned to the duodenum 2nd portion and retroflexed. Took biopsies for H. Pylori at body and antrum and also at GEJ around the ulcers.   Wee then again used the regular adult scope and advanced a TTS balloon in the proxima esophagus and and inflated  To 6, 7, 8, each for 45 minutes and then 9 and 10 mm. The scope was slowly advanced to the duodenum again and withdrawn and mucosa carefully examined. Retroflexion was performed in the stomach. 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  Dilated from 6 to 10 mm.    Broad superficial tearing at site of stricture after dilation with self-limited oozing.    Severe LA grade D ulcerative / erosive esophagitis biopsied at ulcer edges.    Stomach:  Sever patchy hemorrhagic gastritis (biopsies taken for H.pylori at body antrum junction).    Duodenum:   Grossly wnl       COMPLICATIONS:  No complications or blood loss during or in the immediate postoperative  period.        RECOMMENDATION:    1. PPI BID  2. Clears today and advance to soft diet in AM  3. CBC and iron studies  4. Follow-up office visit  EGD with [facility] Dr. Joseline Jackson in 4 weeks.  5. 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     Will sign off  Colonoscopy was not done. Rectal revealed massive dark brown stool and no melena or hematochezia..

## 2021-06-25 NOTE — PROGRESS NOTES
"Received alert and oriented x 4. Check vitals sign and recorded accordingly and due med given per MAR. Monitor sign and symptoms of respiratory distress and treatment given accordingly per MAR.Medicated per MAR and reassessed every 2 hours per protocol. Call light within reach. Steady on his feet. Needs attended. Will continue to monitor./79   Pulse 92   Temp 36.1 °C (97 °F) (Temporal)   Resp 18   Ht 1.676 m (5' 6\")   Wt 65.8 kg (145 lb)   SpO2 93%   BMI 23.40 kg/m² . Still vomiting and c/o abdominal pain. Instructed NPO by midnight for procedure in am. Unable to tolerate bowel prep will try later after medicated.   "

## 2021-06-25 NOTE — PROGRESS NOTES
MD informed via voalte regarding pt unable to tolerate bowel prep and pt is aware that Colonoscopy will be outpatient.

## 2021-06-25 NOTE — ANESTHESIA PREPROCEDURE EVALUATION
From IM note:  31 y.o. male with PMH of asthma and alcohol abuse.  Complains of diffuse abdominal pain, who presented 6/24/2021 with nausea, vomiting, hematuria, and hematemesis.  Patient reports that he has history of heavy alcohol use, and history of pancreatitis in the past.  Patient reports that he is again having diffuse abdominal pain for the past 7 days.  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  He reports that during these hematemesis events he thinks he produces about half a cup of blood each time.  He denies any prior history of GI bleeding in the past or bleeding tendencies.  He additionally states that because of his nausea and vomiting he has not been able to keep any food or liquid down since his symptoms started.  Additionally, about 2 days ago he noticed that his eyes looked little bit more yellow than normal and he additionally was having ledy bright red blood in his urine.  He additionally endorses some bilateral flank pain.  He denies any diarrhea, constipation, melena, or hematochezia.  He did not not take any medications for symptoms.  Of note, patient reports that he is a heavy drinker and he loses track of how much he drinks.  He reports it varies anywhere from a few beers and shots a day to a pint of hard alcohol.  He reports his last drink was about 2 days ago.  He does admit to having an incidence of withdrawal symptoms in the past while he was in [place].  He reports drinking light beer to help take the edge off his symptoms.  He denies having any prior history of seizures from alcohol withdrawal or use in the past.    Relevant Problems   PULMONARY   (positive) Asthma, mild intermittent         (positive) Alcoholic hepatitis without ascites       Physical Exam    Airway   Mallampati: II  TM distance: >3 FB  Neck ROM: full       Cardiovascular - normal exam  Rhythm: regular  Rate: normal  (-) murmur     Dental       Very  poor dentition   Pulmonary - normal exam  Breath sounds clear to auscultation     Abdominal    Neurological - normal exam         Other findings: All front teeth broken and rotten          Anesthesia Plan    ASA 3   ASA physical status 3 criteria: alcohol and/or substance dependence or abuse    Plan - MAC               Induction: intravenous      Pertinent diagnostic labs and testing reviewed    Informed Consent:    Anesthetic plan and risks discussed with patient.

## 2021-06-26 VITALS
DIASTOLIC BLOOD PRESSURE: 75 MMHG | HEART RATE: 80 BPM | BODY MASS INDEX: 23.3 KG/M2 | HEIGHT: 66 IN | RESPIRATION RATE: 18 BRPM | WEIGHT: 145 LBS | OXYGEN SATURATION: 95 % | SYSTOLIC BLOOD PRESSURE: 117 MMHG | TEMPERATURE: 97.7 F

## 2021-06-26 LAB
ANION GAP SERPL CALC-SCNC: 11 MMOL/L (ref 7–16)
BASOPHILS # BLD AUTO: 0.8 % (ref 0–1.8)
BASOPHILS # BLD: 0.04 K/UL (ref 0–0.12)
BUN SERPL-MCNC: 2 MG/DL (ref 8–22)
CALCIUM SERPL-MCNC: 8.7 MG/DL (ref 8.5–10.5)
CHLORIDE SERPL-SCNC: 97 MMOL/L (ref 96–112)
CO2 SERPL-SCNC: 28 MMOL/L (ref 20–33)
CREAT SERPL-MCNC: 0.4 MG/DL (ref 0.5–1.4)
EOSINOPHIL # BLD AUTO: 0.22 K/UL (ref 0–0.51)
EOSINOPHIL NFR BLD: 4.6 % (ref 0–6.9)
ERYTHROCYTE [DISTWIDTH] IN BLOOD BY AUTOMATED COUNT: 52.6 FL (ref 35.9–50)
GLUCOSE SERPL-MCNC: 85 MG/DL (ref 65–99)
HCT VFR BLD AUTO: 42.3 % (ref 42–52)
HGB BLD-MCNC: 14.8 G/DL (ref 14–18)
IMM GRANULOCYTES # BLD AUTO: 0.02 K/UL (ref 0–0.11)
IMM GRANULOCYTES NFR BLD AUTO: 0.4 % (ref 0–0.9)
LIPASE SERPL-CCNC: 57 U/L (ref 11–82)
LYMPHOCYTES # BLD AUTO: 1.09 K/UL (ref 1–4.8)
LYMPHOCYTES NFR BLD: 22.7 % (ref 22–41)
MCH RBC QN AUTO: 36.5 PG (ref 27–33)
MCHC RBC AUTO-ENTMCNC: 35 G/DL (ref 33.7–35.3)
MCV RBC AUTO: 104.4 FL (ref 81.4–97.8)
MONOCYTES # BLD AUTO: 0.47 K/UL (ref 0–0.85)
MONOCYTES NFR BLD AUTO: 9.8 % (ref 0–13.4)
NEUTROPHILS # BLD AUTO: 2.96 K/UL (ref 1.82–7.42)
NEUTROPHILS NFR BLD: 61.7 % (ref 44–72)
NRBC # BLD AUTO: 0 K/UL
NRBC BLD-RTO: 0 /100 WBC
PHOSPHATE SERPL-MCNC: 2.3 MG/DL (ref 2.5–4.5)
PLATELET # BLD AUTO: 55 K/UL (ref 164–446)
PMV BLD AUTO: 11.3 FL (ref 9–12.9)
POTASSIUM SERPL-SCNC: 3.6 MMOL/L (ref 3.6–5.5)
RBC # BLD AUTO: 4.05 M/UL (ref 4.7–6.1)
SODIUM SERPL-SCNC: 136 MMOL/L (ref 135–145)
WBC # BLD AUTO: 4.8 K/UL (ref 4.8–10.8)

## 2021-06-26 PROCEDURE — 85025 COMPLETE CBC W/AUTO DIFF WBC: CPT

## 2021-06-26 PROCEDURE — 36415 COLL VENOUS BLD VENIPUNCTURE: CPT

## 2021-06-26 PROCEDURE — 80048 BASIC METABOLIC PNL TOTAL CA: CPT

## 2021-06-26 PROCEDURE — 83690 ASSAY OF LIPASE: CPT

## 2021-06-26 PROCEDURE — 700102 HCHG RX REV CODE 250 W/ 637 OVERRIDE(OP): Performed by: STUDENT IN AN ORGANIZED HEALTH CARE EDUCATION/TRAINING PROGRAM

## 2021-06-26 PROCEDURE — A9270 NON-COVERED ITEM OR SERVICE: HCPCS | Performed by: STUDENT IN AN ORGANIZED HEALTH CARE EDUCATION/TRAINING PROGRAM

## 2021-06-26 PROCEDURE — 700105 HCHG RX REV CODE 258: Performed by: STUDENT IN AN ORGANIZED HEALTH CARE EDUCATION/TRAINING PROGRAM

## 2021-06-26 PROCEDURE — C9113 INJ PANTOPRAZOLE SODIUM, VIA: HCPCS | Performed by: STUDENT IN AN ORGANIZED HEALTH CARE EDUCATION/TRAINING PROGRAM

## 2021-06-26 PROCEDURE — 700111 HCHG RX REV CODE 636 W/ 250 OVERRIDE (IP): Performed by: STUDENT IN AN ORGANIZED HEALTH CARE EDUCATION/TRAINING PROGRAM

## 2021-06-26 PROCEDURE — 99239 HOSP IP/OBS DSCHRG MGMT >30: CPT | Performed by: STUDENT IN AN ORGANIZED HEALTH CARE EDUCATION/TRAINING PROGRAM

## 2021-06-26 PROCEDURE — 84100 ASSAY OF PHOSPHORUS: CPT

## 2021-06-26 RX ORDER — PANTOPRAZOLE SODIUM 40 MG/1
40 TABLET, DELAYED RELEASE ORAL 2 TIMES DAILY
Qty: 60 TABLET | Refills: 0 | Status: SHIPPED | OUTPATIENT
Start: 2021-06-26 | End: 2021-07-26

## 2021-06-26 RX ORDER — SUCRALFATE ORAL 1 G/10ML
1 SUSPENSION ORAL 4 TIMES DAILY
Qty: 1200 ML | Refills: 0 | Status: SHIPPED | OUTPATIENT
Start: 2021-06-26 | End: 2021-07-26

## 2021-06-26 RX ORDER — TRAMADOL HYDROCHLORIDE 50 MG/1
50 TABLET ORAL EVERY 6 HOURS PRN
Status: DISCONTINUED | OUTPATIENT
Start: 2021-06-26 | End: 2021-06-26 | Stop reason: HOSPADM

## 2021-06-26 RX ORDER — LANOLIN ALCOHOL/MO/W.PET/CERES
100 CREAM (GRAM) TOPICAL DAILY
Qty: 30 TABLET | Refills: 0 | Status: SHIPPED | OUTPATIENT
Start: 2021-06-27 | End: 2021-07-27

## 2021-06-26 RX ORDER — FOLIC ACID 1 MG/1
1 TABLET ORAL DAILY
Qty: 30 TABLET | Refills: 0 | Status: SHIPPED | OUTPATIENT
Start: 2021-06-27 | End: 2021-07-27

## 2021-06-26 RX ADMIN — HYDROMORPHONE HYDROCHLORIDE 0.5 MG: 1 INJECTION, SOLUTION INTRAMUSCULAR; INTRAVENOUS; SUBCUTANEOUS at 05:02

## 2021-06-26 RX ADMIN — DIBASIC SODIUM PHOSPHATE, MONOBASIC POTASSIUM PHOSPHATE AND MONOBASIC SODIUM PHOSPHATE 250 MG: 852; 155; 130 TABLET ORAL at 12:01

## 2021-06-26 RX ADMIN — DIBASIC SODIUM PHOSPHATE, MONOBASIC POTASSIUM PHOSPHATE AND MONOBASIC SODIUM PHOSPHATE 250 MG: 852; 155; 130 TABLET ORAL at 05:02

## 2021-06-26 RX ADMIN — SODIUM CHLORIDE: 9 INJECTION, SOLUTION INTRAVENOUS at 00:16

## 2021-06-26 RX ADMIN — THERA TABS 1 TABLET: TAB at 05:02

## 2021-06-26 RX ADMIN — TRAMADOL HYDROCHLORIDE 50 MG: 50 TABLET, FILM COATED ORAL at 12:01

## 2021-06-26 RX ADMIN — Medication 100 MG: at 05:02

## 2021-06-26 RX ADMIN — PANTOPRAZOLE SODIUM 40 MG: 40 INJECTION, POWDER, LYOPHILIZED, FOR SOLUTION INTRAVENOUS at 05:03

## 2021-06-26 RX ADMIN — HYDROMORPHONE HYDROCHLORIDE 0.5 MG: 1 INJECTION, SOLUTION INTRAMUSCULAR; INTRAVENOUS; SUBCUTANEOUS at 08:04

## 2021-06-26 RX ADMIN — FOLIC ACID 1 MG: 1 TABLET ORAL at 05:02

## 2021-06-26 ASSESSMENT — PAIN DESCRIPTION - PAIN TYPE
TYPE: ACUTE PAIN

## 2021-06-26 ASSESSMENT — LIFESTYLE VARIABLES
TREMOR: NO TREMOR
NAUSEA AND VOMITING: NO NAUSEA AND NO VOMITING
AUDITORY DISTURBANCES: NOT PRESENT
ANXIETY: NO ANXIETY (AT EASE)
HEADACHE, FULLNESS IN HEAD: NOT PRESENT
VISUAL DISTURBANCES: NOT PRESENT
AGITATION: NORMAL ACTIVITY
ORIENTATION AND CLOUDING OF SENSORIUM: ORIENTED AND CAN DO SERIAL ADDITIONS
PAROXYSMAL SWEATS: NO SWEAT VISIBLE
TOTAL SCORE: 0

## 2021-06-26 NOTE — PROGRESS NOTES
Patient discharge instructions, follow up, and medication education provided. PIV removed. AVS signed. Pt escorted off unit in stable condition. No other issues or concerns.

## 2021-06-26 NOTE — CARE PLAN
Problem: Pain - Standard  Goal: Alleviation of pain or a reduction in pain to the patient’s comfort goal  Outcome: Progressing     Problem: Knowledge Deficit - Standard  Goal: Patient and family/care givers will demonstrate understanding of plan of care, disease process/condition, diagnostic tests and medications  Outcome: Progressing   The patient is Stable - Low risk of patient condition declining or worsening    Shift Goals  Clinical Goals: pain management   Patient Goals: Rest  Family Goals: N/A    Progress made toward(s) clinical / shift goals:  Pt reporting 9/10 pain, medicated per MAR. Upon reassessment, pt is sleeping comfortably with no obvious s/sx of distress or discomfort. Pt is able to appropriately communicate pain management needs to staff as they arise. Goal is for pain to be 2 or less throughout shift.    Patient is not progressing towards the following goals: N/A

## 2021-06-26 NOTE — CARE PLAN
Problem: Pain - Standard  Goal: Alleviation of pain or a reduction in pain to the patient’s comfort goal  Outcome: Progressing  Note: Pain decreasing overall per pt     Problem: Knowledge Deficit - Standard  Goal: Patient and family/care givers will demonstrate understanding of plan of care, disease process/condition, diagnostic tests and medications  Outcome: Progressing  Note: Pt verbalizes understanding of plan of care and self care post DC   The patient is Stable - Low risk of patient condition declining or worsening    Shift Goals  Clinical Goals: pain management   Patient Goals: Rest  Family Goals: N/A    Progress made toward(s) clinical / shift goals:  Given meds per MAR, pt states pain is decreasing overall and tolerating soft foods, no nausea.

## 2021-06-26 NOTE — DISCHARGE SUMMARY
Discharge Summary    CHIEF COMPLAINT ON ADMISSION  Chief Complaint   Patient presents with   • Vomiting     Pt complains of mid ab pain, vomiting, and poor oral intake x 5-6 days. Normally daily drinker, last drink yesterday morning    • Blood in Urine     Starting 2 days ago        Reason for Admission  vomiting, blood in urine     Admission Date  6/24/2021    CODE STATUS  Full Code    HPI & HOSPITAL COURSE  31 y.o. male with PMH of asthma and alcohol abuse.  Complains of diffuse abdominal pain, who presented 6/24/2021 with nausea, vomiting, hematuria, and hematemesis.  Patient reports that he has history of heavy alcohol use, and history of pancreatitis in the past.  Patient reports that he is again having diffuse abdominal pain and is accompanied with nausea, vomiting, as well as hematemesis x2 which occurs while he was start dry heaving.  He denies any prior history of GI bleeding in the past or bleeding tendencies.  Additionally, about 2 days ago he noticed that his eyes looked little bit more yellow than normal and he additionally was having ledy bright red blood in his urine.  He additionally endorses some bilateral flank pain.  He reports his last drink was about 2 days ago.      In ER, lab remarkable for thrombocytopenia, ALT/AST 80/220, total bilirubin 7.2, lipase 214. Ultrasound right upper quadrant was performed with findings of hepatic steatosis.  CT abdomen also performed with diffuse colonic wall thickening, as well is against findings of hepatic steatosis.    GI was consulted, s/p EGD 6/25 showing tight Stricture / web at 19 cm form incisors very proximal. Dilated from 6 to 10 mm. Broad superficial tearing at site of stricture after dilation with self-limited oozing. Severe LA grade D ulcerative / erosive esophagitis biopsied at ulcer edges. Stomach:  Sever patchy hemorrhagic gastritis (biopsies taken for H.pylori at body antrum junction). GI rec PPI bid, cbc, iron studies, follow up with GI  outpatient in 4 weeks. Suspecting catherine Francisco J syndrome.    He has been receiving IVF, lipase improving on the day of discharge.     Patient has been counseled about alcohol cessation multiple times while he is hospitalized and patient understands that he needs to quit drinking otherwise he will likely have a premature death due to liver failure.    Therefore, he is discharged in fair and stable condition to home with close outpatient follow-up.    The patient met 2-midnight criteria for an inpatient stay at the time of discharge.    Discharge Date  6/26/21    FOLLOW UP ITEMS POST DISCHARGE  GI Dr. Jackson in 4 weeks  PCP    DISCHARGE DIAGNOSES  Principal Problem:    GI bleed POA: Yes  Active Problems:    Alcohol abuse POA: Yes    Alcoholic hepatitis without ascites POA: Yes    Hypophosphatasia POA: Unknown    Elevated bilirubin POA: Yes    Hyponatremia POA: Unknown    Pancolitis (HCC) POA: Unknown    Elevated lipase POA: Unknown  Resolved Problems:    Pancreatitis POA: Yes      FOLLOW UP  No future appointments.  Bassam Jackson D.O.  [address] [place] [id number] [phone]    In 4 weeks      [address] [id number] [phone]  In 1 week        MEDICATIONS ON DISCHARGE     Medication List      START taking these medications      Instructions   folic acid 1 MG Tabs  Start taking on: June 27, 2021  Commonly known as: FOLVITE   Take 1 tablet by mouth every day for 30 days.  Dose: 1 mg     multivitamin Tabs  Start taking on: June 27, 2021   Take 1 tablet by mouth every day for 30 days.  Dose: 1 tablet     pantoprazole 40 MG Tbec  Commonly known as: PROTONIX   Take 1 tablet by mouth 2 times a day for 30 days.  Dose: 40 mg     sucralfate 1 GM/10ML Susp  Commonly known as: CARAFATE   Take 10 mL by mouth 4 times a day for 30 days.  Dose: 1 g     thiamine 100 MG tablet  Start taking on: June 27, 2021  Commonly known as: THIAMINE   Take 1 tablet by mouth every  "day for 30 days.  Dose: 100 mg        CONTINUE taking these medications      Instructions   albuterol 108 (90 Base) MCG/ACT Aers inhalation aerosol   Inhale 2 Puffs by mouth every 6 hours as needed for Shortness of Breath (cough).  Dose: 2 Puff            Allergies  Allergies   Allergen Reactions   • Morphine Unspecified     Pt sts allergic rxn to morphine is hallucinations.    • Other Drug Swelling     GI Cocktail- \"throat swells\"   • Penicillins Hives       DIET  Orders Placed This Encounter   Procedures   • Diet Order Diet: Level 6 - Soft and Bite Sized; Liquid level: Level 0 - Thin     Standing Status:   Standing     Number of Occurrences:   1     Order Specific Question:   Diet:     Answer:   Level 6 - Soft and Bite Sized [23]     Order Specific Question:   Liquid level     Answer:   Level 0 - Thin       ACTIVITY  As tolerated.  Weight bearing as tolerated    CONSULTATIONS  GI    PROCEDURES  S/p EGD 6/25    LABORATORY  Lab Results   Component Value Date    SODIUM 136 06/26/2021    POTASSIUM 3.6 06/26/2021    CHLORIDE 97 06/26/2021    CO2 28 06/26/2021    GLUCOSE 85 06/26/2021    BUN 2 (L) 06/26/2021    CREATININE 0.40 (L) 06/26/2021        Lab Results   Component Value Date    WBC 4.8 06/26/2021    HEMOGLOBIN 14.8 06/26/2021    HEMATOCRIT 42.3 06/26/2021    PLATELETCT 55 (L) 06/26/2021        Total time of the discharge process exceeds 45 minutes.  "

## 2021-06-26 NOTE — DISCHARGE INSTRUCTIONS
Discharge Instructions per Kirstie Aquino M.D.    Please follow-up with GI Dr. Jackson in 4 weeks as outpatient.  Please stop drinking alcohol as we discussed.   Please quit smoking as we discussed  Take medications as prescribed    DIET: soft diet    ACTIVITY: As tolerated    DIAGNOSIS: GI bleeding with erosive esophagitis, hemorrhagic gastritis, alcoholic hepatitis, hyperbilirubinemia,     Return to ER in the event of new or worsening symptoms. Please note importance of compliance and the patient has agreed to proceed with all medical recommendations and follow up plan indicated above. All medications come with benefits and risks. Risks may include permanent injury or death and these risks can be minimized with close reassessment and monitoring. Please make it to your scheduled follow ups with PCP and GI    Discharge Instructions    Discharged to home by car with relative. Discharged via wheelchair, hospital escort: Yes.  Special equipment needed: Not Applicable    Be sure to schedule a follow-up appointment with your primary care doctor or any specialists as instructed.     Discharge Plan:   Diet Plan: Discussed  Activity Level: Discussed  Confirmed Symptoms Management: Discussed  Medication Reconciliation Updated: Yes    I understand that a diet low in cholesterol, fat, and sodium is recommended for good health. Unless I have been given specific instructions below for another diet, I accept this instruction as my diet prescription.   Other diet: soft    Special Instructions: None    · Is patient discharged on Warfarin / Coumadin?   No     Depression / Suicide Risk    As you are discharged from this [facility] facility, it is important to learn how to keep safe from harming yourself.    Recognize the warning signs:  · Abrupt changes in personality, positive or negative- including increase in energy   · Giving away possessions  · Change in eating patterns- significant weight changes-  positive or  negative  · Change in sleeping patterns- unable to sleep or sleeping all the time   · Unwillingness or inability to communicate  · Depression  · Unusual sadness, discouragement and loneliness  · Talk of wanting to die  · Neglect of personal appearance   · Rebelliousness- reckless behavior  · Withdrawal from people/activities they love  · Confusion- inability to concentrate     If you or a loved one observes any of these behaviors or has concerns about self-harm, here's what you can do:  · Talk about it- your feelings and reasons for harming yourself  · Remove any means that you might use to hurt yourself (examples: pills, rope, extension cords, firearm)  · Get professional help from the community (Mental Health, Substance Abuse, psychological counseling)  · Do not be alone:Call your Safe Contact- someone whom you trust who will be there for you.  · Call your local CRISIS HOTLINE 016-7445 or 915-052-2606  · Call your local Children's Mobile Crisis Response Team [place] (853) 214-6262 or www.[employer]  · Call the toll free National Suicide Prevention Hotlines   · National Suicide Prevention Lifeline 058-149-PPXF (3763)  · National Hope Line Network 800-SUICIDE (187-5783)      Fatty Liver Disease    Fatty liver disease occurs when too much fat has built up in your liver cells. Fatty liver disease is also called hepatic steatosis or steatohepatitis. The liver removes harmful substances from your bloodstream and produces fluids that your body needs. It also helps your body use and store energy from the food you eat.  In many cases, fatty liver disease does not cause symptoms or problems. It is often diagnosed when tests are being done for other reasons. However, over time, fatty liver can cause inflammation that may lead to more serious liver problems, such as scarring of the liver (cirrhosis) and liver failure.  Fatty liver is associated with insulin resistance, increased body fat, high blood pressure  (hypertension), and high cholesterol. These are features of metabolic syndrome and increase your risk for stroke, diabetes, and heart disease.  What are the causes?  This condition may be caused by:  · Drinking too much alcohol.  · Poor nutrition.  · Obesity.  · Cushing's syndrome.  · Diabetes.  · High cholesterol.  · Certain drugs.  · Poisons.  · Some viral infections.  · Pregnancy.  What increases the risk?  You are more likely to develop this condition if you:  · Abuse alcohol.  · Are overweight.  · Have diabetes.  · Have hepatitis.  · Have a high triglyceride level.  · Are pregnant.  What are the signs or symptoms?  Fatty liver disease often does not cause symptoms. If symptoms do develop, they can include:  · Fatigue.  · Weakness.  · Weight loss.  · Confusion.  · Abdominal pain.  · Nausea and vomiting.  · Yellowing of your skin and the white parts of your eyes (jaundice).  · Itchy skin.  How is this diagnosed?  This condition may be diagnosed by:  · A physical exam and medical history.  · Blood tests.  · Imaging tests, such as an ultrasound, CT scan, or MRI.  · A liver biopsy. A small sample of liver tissue is removed using a needle. The sample is then looked at under a microscope.  How is this treated?  Fatty liver disease is often caused by other health conditions. Treatment for fatty liver may involve medicines and lifestyle changes to manage conditions such as:  · Alcoholism.  · High cholesterol.  · Diabetes.  · Being overweight or obese.  Follow these instructions at home:    · Do not drink alcohol. If you have trouble quitting, ask your health care provider how to safely quit with the help of medicine or a supervised program. This is important to keep your condition from getting worse.  · Eat a healthy diet as told by your health care provider. Ask your health care provider about working with a diet and nutrition specialist (dietitian) to develop an eating plan.  · Exercise regularly. This can help you  lose weight and control your cholesterol and diabetes. Talk to your health care provider about an exercise plan and which activities are best for you.  · Take over-the-counter and prescription medicines only as told by your health care provider.  · Keep all follow-up visits as told by your health care provider. This is important.  Contact a health care provider if:  You have trouble controlling your:  · Blood sugar. This is especially important if you have diabetes.  · Cholesterol.  · Drinking of alcohol.  Get help right away if:  · You have abdominal pain.  · You have jaundice.  · You have nausea and vomiting.  · You vomit blood or material that looks like coffee grounds.  · You have stools that are black, tar-like, or bloody.  Summary  · Fatty liver disease develops when too much fat builds up in the cells of your liver.  · Fatty liver disease often causes no symptoms or problems. However, over time, fatty liver can cause inflammation that may lead to more serious liver problems, such as scarring of the liver (cirrhosis).  · You are more likely to develop this condition if you abuse alcohol, are pregnant, are overweight, have diabetes, have hepatitis, or have high triglyceride levels.  · Contact your health care provider if you have trouble controlling your weight, blood sugar, cholesterol, or drinking of alcohol.  This information is not intended to replace advice given to you by your health care provider. Make sure you discuss any questions you have with your health care provider.  Document Released: 02/02/2007 Document Revised: 11/30/2018 Document Reviewed: 09/26/2018  [employer] Patient Education © 2020 [employer]    Infection Prevention in the Home  If you have an infection, may have been exposed to an infection, or are taking care of someone who has an infection, it is important to know how to keep the infection from spreading. Follow your health care provider's instructions and use these guidelines to help  stop the spread of infection.  How infections are spread  In order for an infection to spread, the following must be present:  · A germ. This may be a virus, bacteria, fungus, or parasite.  · A place for the germ to live. This may be:  ? On or in a person, animal, plant, or food.  ? In soil or water.  ? On surfaces, such as a door handle.  · A person or animal who can develop a disease if the germ enters the body (host). The host does not have resistance to the germ.  · A way for the germ to enter the host. This may occur by:  ? Direct contact with an infected person or animal. This can happen through shaking hands or hugging. Some germs can also travel through the air and spread to others. This can happen when an infected person coughs or sneezes on or near other people.  ? Indirect contact. This occurs when the germ enters the host through contact with an infected object. Examples include:  § Eating or drinking food or water that has the germ (is contaminated).  § Touching a contaminated surface with your hands, and then touching your face, eyes, nose, or mouth.  Supplies needed:  · Soap.  · Alcohol-based hand .  · Standard cleaning products.  · Disinfectants, such as bleach.  · Reusable cleaning cloths, sponges, or paper towels.  · Disposable or reusable utility gloves.  How to prevent infection from spreading  There are several things that you can do to help prevent infection from spreading.  Take these general actions  Everyone should take the following actions to prevent the spread of infection:  · Wash your hands often with soap and water for at least 20 seconds. If soap and water are not available, use alcohol-based hand .  · Avoid touching your face, mouth, nose, or eyes.  · Cough or sneeze into a tissue, sleeve, or elbow instead of into your hand or into the air.  ? If you cough or sneeze into a tissue, throw it away immediately and wash your hands.    Keep your bathroom clean  · Provide  soap.  · Change towels and washcloths frequently.  · Change toothbrushes often and store them separately in a clean, dry place.  · Clean and disinfect all surfaces, including the toilet, floor, tub, shower, and sink.  · Do not share personal items, such as razors, toothbrushes, deodorant, palomino, brushes, towels, and washcloths.  Maintain hygiene in the kitchen    · Wash your hands before and after preparing food and before you eat.  · Clean the inside of your refrigerator each week.  · Keep your refrigerator set at 40°F (4°C) or less, and set your freezer at 0°F (-18°C) or less.  · Keep work surfaces clean. Disinfect them regularly.  · Wash your dishes in hot, soapy water. Air-dry your dishes or use a .  · Do not share dishes or eating utensils.  Handle food safely  · Store food carefully.  · Refrigerate leftovers promptly in covered containers.  · Throw out stale or spoiled food.  · Thaw foods in the refrigerator or microwave, not at room temperature.  · Serve foods at the proper temperature. Do not eat raw meat. Make sure it is cooked to the appropriate temperature. Cook eggs until they are firm.  · Wash fruits and vegetables under running water.  · Use separate cutting boards, plates, and utensils for raw foods and cooked foods.  · Use a clean spoon each time you sample food while cooking.  Do laundry the right way  · Wear gloves if laundry is visibly soiled.  · Do not shake soiled laundry. Doing that may send germs into the air.  · Wash laundry in hot water.  · If you cannot wash the laundry right away, place it in a plastic bag and wash it as soon as possible.  Be careful around animals and pets  · Wash your hands before and after touching animals.  · If you have a pet, ensure that your pet stays clean. Do not let people with weak immune systems touch bird droppings, fish tank water, or a litter box.  ? If you have a pet cage or litter box, be sure to clean it every day.  · If you are sick, stay away  from animals and have someone else care for them if possible.  How to clean and disinfect objects and surfaces  Precautions  · Some disinfectants work for certain germs and not others. Read the 's instructions or read online resources to determine if the product you are using will work for the germ you are trying to remove.  · If you choose to use bleach, use it safely. Never mix it with other cleaning products, especially those that contain ammonia. This mixture can create a dangerous gas that may be deadly.  · Keep proper movement of fresh air in your home (ventilation).  · Pour used mop water down the utility sink or toilet. Do not pour this water down the kitchen sink.  Objects and surfaces    · If surfaces are visibly soiled, clean them first with soap and water before disinfecting.  · Disinfect surfaces that are frequently touched every day. This may include:  ? Counters.  ? Tables.  ? Doorknobs.  ? Sinks and faucets.  ? Electronics, such as:  § Phones.  § Remote controls.  § Keyboards.  § Computers and tablets.  Cleaning supplies  Some cleaning supplies can breed germs. Take good care of them to prevent germs from spreading. To do this:  · Soak toilet brushes, mops, and sponges in bleach and water for 5 minutes after each use, or according to 's instructions.  · Wash reusable cleaning cloths and sanitize sponges after each use.  · Throw away disposable gloves after one use.  · Replace reusable utility gloves if they are cracked or torn or if they start to peel.  Additional actions if you are sick  If you live with other people:    · Avoid close contact with those around you. Stay at least 3 ft (1 m) away from others, if possible.  · Use a separate bathroom, if possible.  · If possible, sleep in a separate bedroom or in a separate bed to prevent infecting other household members.  ? Change bedroom linens each week or whenever they are soiled.  · Have everyone in the household wash hands  often with soap and water. If soap and water are not available, use alcohol-based hand .  In general:  · Stay home except to get medical care. Call ahead before visiting your health care provider.  · Ask others to get groceries and household supplies and to refill prescriptions for you.  · Avoid public areas. Try not to take public transportation.  · If you can, wear a mask if you need to go out of the house, or if you are in close contact with someone who is not sick.  · Avoid visitors until you have completely recovered, or until you have no signs and symptoms of infection.  · Avoid preparing food or providing care for others. If you must prepare food or provide care for others, wear a mask and wash your hands before and after doing these things.  Where to find more information  · Centers for Disease Control and Prevention: www.cdc.gov/nonpharmaceutical-interventions/index.html  · World Health Organization (WHO): www.who.int/infection-prevention/about/en/  · Association for Professionals in Infection Control and Epidemiology: professionals.site.apic.org/settings-of-care/non-healthcare-setting/home/  Summary  · It is important to know how to keep the infection from spreading.  · Make sure everyone in your household washes their hands often with soap and water.  · Disinfect surfaces that are frequently touched every day.  · If you are sick, stay home except to get medical care.  This information is not intended to replace advice given to you by your health care provider. Make sure you discuss any questions you have with your health care provider.  Document Released: 09/26/2009 Document Revised: 04/14/2020 Document Reviewed: 03/13/2020  [employer] Patient Education © 2020 [employer]

## 2021-06-26 NOTE — PROGRESS NOTES
Assumed cares at 0700, report recieved from [facility] RN. Pt A/Ox4, states pain is 6/10, given PRN;effective. Vitals stable. Tolerating soft food w/out nausea. Bed locked, 2 rails up, bed in lowest position. Call light in place, belongings at bedside, no needs at this time, and hourly rounding in place.

## 2021-09-10 ENCOUNTER — APPOINTMENT (OUTPATIENT)
Dept: RADIOLOGY | Facility: MEDICAL CENTER | Age: 31
End: 2021-09-10
Attending: EMERGENCY MEDICINE
Payer: COMMERCIAL

## 2021-09-10 ENCOUNTER — HOSPITAL ENCOUNTER (EMERGENCY)
Facility: MEDICAL CENTER | Age: 31
End: 2021-09-10
Attending: EMERGENCY MEDICINE
Payer: COMMERCIAL

## 2021-09-10 VITALS
WEIGHT: 145.06 LBS | RESPIRATION RATE: 20 BRPM | DIASTOLIC BLOOD PRESSURE: 86 MMHG | TEMPERATURE: 97.9 F | OXYGEN SATURATION: 92 % | SYSTOLIC BLOOD PRESSURE: 130 MMHG | BODY MASS INDEX: 24.17 KG/M2 | HEART RATE: 86 BPM | HEIGHT: 65 IN

## 2021-09-10 DIAGNOSIS — Z20.822 COUGH WITH EXPOSURE TO COVID-19 VIRUS: ICD-10-CM

## 2021-09-10 DIAGNOSIS — R06.2 WHEEZING: ICD-10-CM

## 2021-09-10 DIAGNOSIS — R05.8 COUGH WITH EXPOSURE TO COVID-19 VIRUS: ICD-10-CM

## 2021-09-10 LAB
ALBUMIN SERPL BCP-MCNC: 4.7 G/DL (ref 3.2–4.9)
ALBUMIN/GLOB SERPL: 1.5 G/DL
ALP SERPL-CCNC: 74 U/L (ref 30–99)
ALT SERPL-CCNC: 39 U/L (ref 2–50)
ANION GAP SERPL CALC-SCNC: 14 MMOL/L (ref 7–16)
AST SERPL-CCNC: 39 U/L (ref 12–45)
BASOPHILS # BLD AUTO: 1.3 % (ref 0–1.8)
BASOPHILS # BLD: 0.14 K/UL (ref 0–0.12)
BILIRUB SERPL-MCNC: 0.7 MG/DL (ref 0.1–1.5)
BUN SERPL-MCNC: 11 MG/DL (ref 8–22)
CALCIUM SERPL-MCNC: 10.3 MG/DL (ref 8.5–10.5)
CHLORIDE SERPL-SCNC: 103 MMOL/L (ref 96–112)
CO2 SERPL-SCNC: 20 MMOL/L (ref 20–33)
CREAT SERPL-MCNC: 0.56 MG/DL (ref 0.5–1.4)
EKG IMPRESSION: NORMAL
EOSINOPHIL # BLD AUTO: 1.07 K/UL (ref 0–0.51)
EOSINOPHIL NFR BLD: 9.9 % (ref 0–6.9)
ERYTHROCYTE [DISTWIDTH] IN BLOOD BY AUTOMATED COUNT: 39.8 FL (ref 35.9–50)
FLUAV RNA SPEC QL NAA+PROBE: NEGATIVE
FLUBV RNA SPEC QL NAA+PROBE: NEGATIVE
GLOBULIN SER CALC-MCNC: 3.2 G/DL (ref 1.9–3.5)
GLUCOSE SERPL-MCNC: 100 MG/DL (ref 65–99)
HCT VFR BLD AUTO: 48.6 % (ref 42–52)
HGB BLD-MCNC: 17.8 G/DL (ref 14–18)
IMM GRANULOCYTES # BLD AUTO: 0.03 K/UL (ref 0–0.11)
IMM GRANULOCYTES NFR BLD AUTO: 0.3 % (ref 0–0.9)
LYMPHOCYTES # BLD AUTO: 3.14 K/UL (ref 1–4.8)
LYMPHOCYTES NFR BLD: 29.1 % (ref 22–41)
MCH RBC QN AUTO: 31.8 PG (ref 27–33)
MCHC RBC AUTO-ENTMCNC: 36.6 G/DL (ref 33.7–35.3)
MCV RBC AUTO: 86.8 FL (ref 81.4–97.8)
MONOCYTES # BLD AUTO: 0.97 K/UL (ref 0–0.85)
MONOCYTES NFR BLD AUTO: 9 % (ref 0–13.4)
NEUTROPHILS # BLD AUTO: 5.45 K/UL (ref 1.82–7.42)
NEUTROPHILS NFR BLD: 50.4 % (ref 44–72)
NRBC # BLD AUTO: 0 K/UL
NRBC BLD-RTO: 0 /100 WBC
PLATELET # BLD AUTO: 215 K/UL (ref 164–446)
PMV BLD AUTO: 9.5 FL (ref 9–12.9)
POTASSIUM SERPL-SCNC: 4 MMOL/L (ref 3.6–5.5)
PROT SERPL-MCNC: 7.9 G/DL (ref 6–8.2)
RBC # BLD AUTO: 5.6 M/UL (ref 4.7–6.1)
RSV RNA SPEC QL NAA+PROBE: NEGATIVE
SARS-COV-2 RNA RESP QL NAA+PROBE: NOTDETECTED
SODIUM SERPL-SCNC: 137 MMOL/L (ref 135–145)
SPECIMEN SOURCE: NORMAL
WBC # BLD AUTO: 10.8 K/UL (ref 4.8–10.8)

## 2021-09-10 PROCEDURE — 700105 HCHG RX REV CODE 258: Performed by: EMERGENCY MEDICINE

## 2021-09-10 PROCEDURE — 80053 COMPREHEN METABOLIC PANEL: CPT

## 2021-09-10 PROCEDURE — 36415 COLL VENOUS BLD VENIPUNCTURE: CPT

## 2021-09-10 PROCEDURE — 0241U HCHG SARS-COV-2 COVID-19 NFCT DS RESP RNA 4 TRGT MIC: CPT

## 2021-09-10 PROCEDURE — C9803 HOPD COVID-19 SPEC COLLECT: HCPCS | Performed by: EMERGENCY MEDICINE

## 2021-09-10 PROCEDURE — 93005 ELECTROCARDIOGRAM TRACING: CPT | Performed by: EMERGENCY MEDICINE

## 2021-09-10 PROCEDURE — 93005 ELECTROCARDIOGRAM TRACING: CPT

## 2021-09-10 PROCEDURE — 99284 EMERGENCY DEPT VISIT MOD MDM: CPT

## 2021-09-10 PROCEDURE — 96374 THER/PROPH/DIAG INJ IV PUSH: CPT

## 2021-09-10 PROCEDURE — 700111 HCHG RX REV CODE 636 W/ 250 OVERRIDE (IP): Performed by: EMERGENCY MEDICINE

## 2021-09-10 PROCEDURE — 71045 X-RAY EXAM CHEST 1 VIEW: CPT

## 2021-09-10 PROCEDURE — 85025 COMPLETE CBC W/AUTO DIFF WBC: CPT

## 2021-09-10 RX ORDER — PREDNISONE 20 MG/1
40 TABLET ORAL DAILY
Qty: 10 TABLET | Refills: 0 | Status: SHIPPED | OUTPATIENT
Start: 2021-09-10 | End: 2021-09-15

## 2021-09-10 RX ORDER — ALBUTEROL SULFATE 90 UG/1
2 AEROSOL, METERED RESPIRATORY (INHALATION) ONCE
Status: COMPLETED | OUTPATIENT
Start: 2021-09-10 | End: 2021-09-10

## 2021-09-10 RX ORDER — SODIUM CHLORIDE, SODIUM LACTATE, POTASSIUM CHLORIDE, CALCIUM CHLORIDE 600; 310; 30; 20 MG/100ML; MG/100ML; MG/100ML; MG/100ML
1000 INJECTION, SOLUTION INTRAVENOUS ONCE
Status: COMPLETED | OUTPATIENT
Start: 2021-09-10 | End: 2021-09-10

## 2021-09-10 RX ORDER — METHYLPREDNISOLONE SODIUM SUCCINATE 125 MG/2ML
125 INJECTION, POWDER, LYOPHILIZED, FOR SOLUTION INTRAMUSCULAR; INTRAVENOUS ONCE
Status: COMPLETED | OUTPATIENT
Start: 2021-09-10 | End: 2021-09-10

## 2021-09-10 RX ORDER — ALBUTEROL SULFATE 90 UG/1
2 AEROSOL, METERED RESPIRATORY (INHALATION) EVERY 4 HOURS PRN
Qty: 8.5 G | Refills: 0 | Status: SHIPPED | OUTPATIENT
Start: 2021-09-10

## 2021-09-10 RX ADMIN — METHYLPREDNISOLONE SODIUM SUCCINATE 125 MG: 125 INJECTION, POWDER, FOR SOLUTION INTRAMUSCULAR; INTRAVENOUS at 07:04

## 2021-09-10 RX ADMIN — ALBUTEROL SULFATE 2 PUFF: 90 AEROSOL, METERED RESPIRATORY (INHALATION) at 07:27

## 2021-09-10 RX ADMIN — SODIUM CHLORIDE, POTASSIUM CHLORIDE, SODIUM LACTATE AND CALCIUM CHLORIDE 1000 ML: 600; 310; 30; 20 INJECTION, SOLUTION INTRAVENOUS at 07:04

## 2021-09-10 ASSESSMENT — ENCOUNTER SYMPTOMS
SHORTNESS OF BREATH: 1
ABDOMINAL PAIN: 0
BACK PAIN: 0
NAUSEA: 0
FEVER: 0
HEMOPTYSIS: 0
CHILLS: 0
VOMITING: 0
WHEEZING: 1
COUGH: 1
HEADACHES: 0

## 2021-09-10 ASSESSMENT — FIBROSIS 4 INDEX: FIB4 SCORE: 9.79

## 2021-09-10 NOTE — ED NOTES
Patient vital signs rechecked and documented per [facility]. Patient denies any new needs at this time.  Patient updated on wait times, thanked for patience. Pt informed to alert triage tech or triage RN with any needs and/or changes in condition; patient verbalized understanding.

## 2021-09-10 NOTE — ED PROVIDER NOTES
ED Provider Note    ED Provider Note    Primary care provider: Pcp Pt States None  Means of arrival: EMS  History obtained from: PATIENT, friend  History limited by: None    CHIEF COMPLAINT  Chief Complaint   Patient presents with   • Shortness of Breath     PT reports increasing SOB and cough x past 3 weeks now with wheezing and productive cough.       CHRISTINA Ramírez is a 31 y.o. male who presents to the Emergency Department with a chief complaint of shortness of breath and wheezing.  Patient has a remote history of asthma as a child.  He states it really resolved by grade school and he has not had problems with it since until now.  He has been struggling for about 3 weeks and just cannot seem to get it to go away.  He smokes about 2 to 3 cigarettes/week.  At most, he smoked about 4 cigarettes/day.  He reports that he quit drinking and as a result, he smokes a lot less.  He has not had a fever.  No nausea or vomiting.  He does report a cough.  He also saw his parents yesterday who are recovering from Covid.  They have mild cases, they are both vaccinated.  He has not been vaccinated.  He recently moved back to the area from [place].  No nausea or vomiting.  No urinary complaints.    REVIEW OF SYSTEMS  Review of Systems   Constitutional: Negative for chills and fever.   HENT: Negative for congestion.    Respiratory: Positive for cough, shortness of breath and wheezing. Negative for hemoptysis.    Cardiovascular: Negative for chest pain.   Gastrointestinal: Negative for abdominal pain, nausea and vomiting.   Genitourinary: Negative for dysuria.   Musculoskeletal: Negative for back pain.   Neurological: Negative for headaches.   All other systems reviewed and are negative.      PAST MEDICAL HISTORY   has a past medical history of Asthma and Helicobacter pylori (H. pylori) infection.    SURGICAL HISTORY   has a past surgical history that includes upper gi endoscopy,diagnosis (N/A, 6/25/2021); upper gi  "endoscopy,biopsy (N/A, 6/25/2021); and upper gi endoscopy,w/dilat,gastric out (N/A, 6/25/2021).    SOCIAL HISTORY  Social History     Tobacco Use   • Smoking status: Current Every Day Smoker   • Smokeless tobacco: Never Used   Substance Use Topics   • Alcohol use: Yes     Comment: daily vodka and beer, last drink yesterday morning    • Drug use: Yes     Comment: Cocaine occasionally, marijuana       Social History     Substance and Sexual Activity   Drug Use Yes    Comment: Cocaine occasionally, marijuana        FAMILY HISTORY  Family History   Family history unknown: Yes       CURRENT MEDICATIONS  Home Medications    **Home medications have not yet been reviewed for this encounter**         ALLERGIES  Allergies   Allergen Reactions   • Morphine Unspecified     Pt sts allergic rxn to morphine is hallucinations.    • Other Drug Swelling     GI Cocktail- \"throat swells\"   • Penicillins Hives       PHYSICAL EXAM  VITAL SIGNS: /86   Pulse 86   Temp 36.6 °C (97.9 °F) (Temporal)   Resp 20   Ht 1.651 m (5' 5\")   Wt 65.8 kg (145 lb 1 oz)   SpO2 92%   BMI 24.14 kg/m²   Vitals reviewed.  Constitutional: Patient is oriented to person, place, and time. Appears well-developed and well-nourished. Mild-moderate distress.    Head: Normocephalic and atraumatic.   Ears: Normal external ears bilaterally.   Mouth/Throat: Mask in place  Eyes: Conjunctivae are normal. Pupils are equal and round  Neck: Normal range of motion. Neck supple.  Cardiovascular: Tachycardia, regular rhythm and normal heart sounds.  Pulmonary/Chest: Increased work of breathing.  Diffuse wheezes both anteriorly and posteriorly.  No rhonchi or rales.  No chest wall tenderness.  Abdominal: Soft. Bowel sounds are normal. There is no tenderness. No rebound or guarding, or peritoneal signs.   Musculoskeletal: No edema and no tenderness  Neurological: No focal deficits.   Skin: Skin is warm and dry. No erythema. No pallor.   Psychiatric: Patient has a " normal mood and affect.     LABS  Results for orders placed or performed during the hospital encounter of 09/10/21   CBC WITH DIFFERENTIAL   Result Value Ref Range    WBC 10.8 4.8 - 10.8 K/uL    RBC 5.60 4.70 - 6.10 M/uL    Hemoglobin 17.8 14.0 - 18.0 g/dL    Hematocrit 48.6 42.0 - 52.0 %    MCV 86.8 81.4 - 97.8 fL    MCH 31.8 27.0 - 33.0 pg    MCHC 36.6 (H) 33.7 - 35.3 g/dL    RDW 39.8 35.9 - 50.0 fL    Platelet Count 215 164 - 446 K/uL    MPV 9.5 9.0 - 12.9 fL    Neutrophils-Polys 50.40 44.00 - 72.00 %    Lymphocytes 29.10 22.00 - 41.00 %    Monocytes 9.00 0.00 - 13.40 %    Eosinophils 9.90 (H) 0.00 - 6.90 %    Basophils 1.30 0.00 - 1.80 %    Immature Granulocytes 0.30 0.00 - 0.90 %    Nucleated RBC 0.00 /100 WBC    Neutrophils (Absolute) 5.45 1.82 - 7.42 K/uL    Lymphs (Absolute) 3.14 1.00 - 4.80 K/uL    Monos (Absolute) 0.97 (H) 0.00 - 0.85 K/uL    Eos (Absolute) 1.07 (H) 0.00 - 0.51 K/uL    Baso (Absolute) 0.14 (H) 0.00 - 0.12 K/uL    Immature Granulocytes (abs) 0.03 0.00 - 0.11 K/uL    NRBC (Absolute) 0.00 K/uL   CMP   Result Value Ref Range    Sodium 137 135 - 145 mmol/L    Potassium 4.0 3.6 - 5.5 mmol/L    Chloride 103 96 - 112 mmol/L    Co2 20 20 - 33 mmol/L    Anion Gap 14.0 7.0 - 16.0    Glucose 100 (H) 65 - 99 mg/dL    Bun 11 8 - 22 mg/dL    Creatinine 0.56 0.50 - 1.40 mg/dL    Calcium 10.3 8.5 - 10.5 mg/dL    AST(SGOT) 39 12 - 45 U/L    ALT(SGPT) 39 2 - 50 U/L    Alkaline Phosphatase 74 30 - 99 U/L    Total Bilirubin 0.7 0.1 - 1.5 mg/dL    Albumin 4.7 3.2 - 4.9 g/dL    Total Protein 7.9 6.0 - 8.2 g/dL    Globulin 3.2 1.9 - 3.5 g/dL    A-G Ratio 1.5 g/dL   ESTIMATED GFR   Result Value Ref Range    GFR If African American >60 >60 mL/min/1.73 m 2    GFR If Non African American >60 >60 mL/min/1.73 m 2   COV-2, FLU A/B, AND RSV BY PCR (2-4 HOURS CEPHEID): Collect NP swab in VTM    Specimen: Nasopharyngeal; Respirate   Result Value Ref Range    Influenza virus A RNA Negative Negative    Influenza virus B, PCR  Negative Negative    RSV, PCR Negative Negative    SARS-CoV-2 by PCR NotDetected     SARS-CoV-2 Source NP Swab    EKG   Result Value Ref Range    Report       [facility] Emergency Dept.    Test Date:  2021-09-10  Pt Name:    NANCY HINSON                Department: ER  MRN:        [id number]                      Room:       [facility]  Gender:     Male                         Technician: [id number]  :        1990                   Requested By:ER TRIAGE PROTOCOL  Order #:    [id number]                    Reading MD: SAILAJA CAAZRES DO    Measurements  Intervals                                Axis  Rate:       126                          P:          80  VA:         131                          QRS:        80  QRSD:       84                           T:          50  QT:         318  QTc:        461    Interpretive Statements  Sinus tachycardia  LAE, consider biatrial enlargement  RSR' in V1 or V2, probably normal variant  Minimal ST depression, diffuse leads  Compared to ECG 2021 04:19:58  RSR' in V1 or V2 now present  ST (T wave) deviation now present  Sinus arrhythmia no longer present  Electronically Signed On 9-  7:04:43 PDT by SAILAJA CAZARES DO         All labs reviewed by me.    EKG Interpretation  Interpreted by me    RADIOLOGY  DX-CHEST-PORTABLE (1 VIEW)   Final Result         1.  No acute cardiopulmonary disease.        The radiologist's interpretation of all radiological studies have been reviewed by me.    COURSE & MEDICAL DECISION MAKING  Pertinent Labs & Imaging studies reviewed. (See chart for details)    Obtained and reviewed past medical records.  Patient's last encounter was a hospital admission in  of this year patient was seen for vomiting, abdominal pain blood in her urine with a history of chronic alcohol abuse.    6:48 AM - Patient seen and examined at bedside.  This is a pleasant 31-year-old male who presents with cough, wheezing, shortness of breath has been  going on for 3 weeks, gradually worsening.  He has diffuse wheezes.  He is mildly tachycardic, ranging between the high 90s and low 100s.  He is not hypoxic though he does have increased work of breathing.  Recent exposure to Covid.  He is unvaccinated.  He will be swabbed.  Of ordered an x-ray, labs and IV fluid resuscitation, bronchodilators as well as steroids.    0835AM patient is reevaluated at the bedside.  His work of breathing is much improved.  Scant wheezes now.  He is oxygenating normally.  He is not tachycardic.  Awaiting Covid results.  There is no evidence of pneumonia.  Patient's presentation is very much consistent with asthma.  It is however, curious, wide now, after many years of not having any asthma symptoms really, since he was a grade school child.  No recent changes in his environment.  If anything, he has decreased his smoking.  In any event, will plan for treatment for asthma.  He will be discharged home with steroids and an inhaler. he is given strict return precautions..     Covid testing did result, prior to discharge and patient is made aware that his RSV, Covid and flu testing is negative.    FINAL IMPRESSION  1. Wheezing    2. Cough with exposure to COVID-19 virus

## 2021-09-10 NOTE — ED TRIAGE NOTES
"Chief Complaint   Patient presents with   • Shortness of Breath     PT reports increasing SOB and cough x past 3 weeks now with wheezing and productive cough.     Blood Pressure 157/113   Pulse (Abnormal) 133   Temperature 36.3 °C (97.4 °F) (Temporal)   Respiration (Abnormal) 24   Height 1.651 m (5' 5\")   Weight 65.8 kg (145 lb 1 oz)   Oxygen Saturation 95%   Body Mass Index 24.14 kg/m²     "

## 2022-10-12 NOTE — ED TRIAGE NOTES
Lab draw completed, blood sent to lab   Patient unable to provided urine sample at this time. Specimen cup and instructions provided    Otezla Counseling: The side effects of Otezla were discussed with the patient, including but not limited to worsening or new depression, weight loss, diarrhea, nausea, upper respiratory tract infection, and headache. Patient instructed to call the office should any adverse effect occur.  The patient verbalized understanding of the proper use and possible adverse effects of Otezla.  All the patient's questions and concerns were addressed.

## (undated) DEVICE — SET EXTENSION WITH 2 PORTS (48EA/CA) ***PART #2C8610 IS A SUBSTITUTE*****

## (undated) DEVICE — TUBE CONNECTING SUCTION - CLEAR PLASTIC STERILE 72 IN (50EA/CA)

## (undated) DEVICE — CANISTER SUCTION RIGID RED 1500CC (40EA/CA)

## (undated) DEVICE — BITE BLOCK ADULT 60FR (100EA/CA)

## (undated) DEVICE — KIT CUSTOM PROCEDURE SINGLE FOR ENDO  (15/CA)

## (undated) DEVICE — FILM CASSETTE ENDO

## (undated) DEVICE — FORCEPALLIGATOR BIOPSY 5.0 - (20/BX)

## (undated) DEVICE — CANISTER SUCTION 3000ML MECHANICAL FILTER AUTO SHUTOFF MEDI-VAC NONSTERILE LF DISP  (40EA/CA)

## (undated) DEVICE — BALLOON DILATION WIREGUIDED 6-7-8 240CM

## (undated) DEVICE — BALLOON CRE WG 8-10MM 240CM 5.5 F G

## (undated) DEVICE — CONTAINER, SPECIMEN, STERILE

## (undated) DEVICE — MANIFOLD NEPTUNE 1 PORT (20/PK)

## (undated) DEVICE — NEPTUNE 4 PORT MANIFOLD - (20/PK)

## (undated) DEVICE — ELECTRODE 850 FOAM ADHESIVE - HYDROGEL RADIOTRNSPRNT (50/PK)

## (undated) DEVICE — SYRINGE ALLIANCE INFLATION (5EA/BX)

## (undated) DEVICE — TOWEL STOP TIMEOUT SAFETY FLAG (40EA/CA)

## (undated) DEVICE — TUBING CLEARLINK DUO-VENT - C-FLO (48EA/CA)